# Patient Record
Sex: FEMALE | Race: WHITE | NOT HISPANIC OR LATINO | Employment: FULL TIME | URBAN - METROPOLITAN AREA
[De-identification: names, ages, dates, MRNs, and addresses within clinical notes are randomized per-mention and may not be internally consistent; named-entity substitution may affect disease eponyms.]

---

## 2017-01-05 ENCOUNTER — ALLIED HEALTH/NURSE VISIT (OUTPATIENT)
Dept: NURSING | Facility: CLINIC | Age: 37
End: 2017-01-05
Payer: COMMERCIAL

## 2017-01-05 DIAGNOSIS — Z23 NEED FOR PROPHYLACTIC VACCINATION AND INOCULATION AGAINST INFLUENZA: Primary | ICD-10-CM

## 2017-01-05 PROCEDURE — 90686 IIV4 VACC NO PRSV 0.5 ML IM: CPT

## 2017-01-05 PROCEDURE — 99207 ZZC NO CHARGE NURSE ONLY: CPT

## 2017-01-05 PROCEDURE — 90471 IMMUNIZATION ADMIN: CPT

## 2017-01-05 NOTE — PROGRESS NOTES
Injectable Influenza Immunization Documentation    1.  Is the person to be vaccinated sick today?  No    2. Does the person to be vaccinated have an allergy to eggs or to a component of the vaccine?  No    3. Has the person to be vaccinated today ever had a serious reaction to influenza vaccine in the past?  No    4. Has the person to be vaccinated ever had Guillain-[place] syndrome?  No     Form completed by PATIENT

## 2017-03-07 ENCOUNTER — OFFICE VISIT (OUTPATIENT)
Dept: PEDIATRICS | Facility: CLINIC | Age: 37
End: 2017-03-07
Payer: COMMERCIAL

## 2017-03-07 ENCOUNTER — TELEPHONE (OUTPATIENT)
Dept: PEDIATRICS | Facility: CLINIC | Age: 37
End: 2017-03-07

## 2017-03-07 VITALS
TEMPERATURE: 98.2 F | HEIGHT: 60 IN | BODY MASS INDEX: 26.72 KG/M2 | DIASTOLIC BLOOD PRESSURE: 74 MMHG | HEART RATE: 80 BPM | WEIGHT: 136.1 LBS | SYSTOLIC BLOOD PRESSURE: 112 MMHG

## 2017-03-07 DIAGNOSIS — F41.1 GENERALIZED ANXIETY DISORDER: Primary | ICD-10-CM

## 2017-03-07 PROCEDURE — 99213 OFFICE O/P EST LOW 20 MIN: CPT | Performed by: INTERNAL MEDICINE

## 2017-03-07 RX ORDER — SERTRALINE HYDROCHLORIDE 100 MG/1
200 TABLET, FILM COATED ORAL DAILY
Qty: 60 TABLET | Refills: 0 | Status: SHIPPED | OUTPATIENT
Start: 2017-03-07 | End: 2017-03-07

## 2017-03-07 RX ORDER — SERTRALINE HYDROCHLORIDE 100 MG/1
200 TABLET, FILM COATED ORAL DAILY
Qty: 180 TABLET | Refills: 3 | Status: SHIPPED | OUTPATIENT
Start: 2017-03-07 | End: 2018-03-27

## 2017-03-07 ASSESSMENT — ANXIETY QUESTIONNAIRES
6. BECOMING EASILY ANNOYED OR IRRITABLE: NOT AT ALL
2. NOT BEING ABLE TO STOP OR CONTROL WORRYING: NOT AT ALL
7. FEELING AFRAID AS IF SOMETHING AWFUL MIGHT HAPPEN: NOT AT ALL
3. WORRYING TOO MUCH ABOUT DIFFERENT THINGS: NOT AT ALL
GAD7 TOTAL SCORE: 0
1. FEELING NERVOUS, ANXIOUS, OR ON EDGE: NOT AT ALL
5. BEING SO RESTLESS THAT IT IS HARD TO SIT STILL: NOT AT ALL

## 2017-03-07 ASSESSMENT — PATIENT HEALTH QUESTIONNAIRE - PHQ9: 5. POOR APPETITE OR OVEREATING: NOT AT ALL

## 2017-03-07 NOTE — TELEPHONE ENCOUNTER
Panel Management Review      Patient has the following on her problem list:     Depression / Dysthymia review  PHQ-9 SCORE 1/11/2013 1/5/2015 3/7/2017   Total Score 0 2 -   Total Score - - 0      Patient is due for:  PHQ9      Composite cancer screening  Chart review shows that this patient is due/due soon for the following Pap Smear  Summary:    Patient is due/failing the following:   PAP    Action needed:   Patient needs office visit for FU depression.    Type of outreach:    Phone, spoke to patient.  appt scheduled    Questions for provider review:    None                                                                                                                                    Aliza WILCOX, POLLY,AAMA       Chart routed to not indicated .

## 2017-03-07 NOTE — PROGRESS NOTES
SUBJECTIVE:                                                    Lynda Cohen is a 36 year old female who presents to clinic today for the following health issues:    Medication Followup of Zoloft    Taking Medication as prescribed: yes    Side Effects:  drowsy    Medication Helping Symptoms:  yes   patient here for zoloft refill, working well without any side effects. patient has been on for years and it works very well. Anxiety has been well controlled. No other concerns or complaints today.     Problem list and histories reviewed & adjusted, as indicated.  Additional history: as documented    Patient Active Problem List    Diagnosis Date Noted     Short cord complicating labor and delivery, delivered 01/27/2014     Priority: Medium     Diagnosis updated by automated process. Provider to review and confirm.       Encounter for routine gynecological examination 03/17/2015     Problem list name updated by automated process. Provider to review       Blood type A+ 08/23/2012     CARDIOVASCULAR SCREENING; LDL GOAL LESS THAN 160 03/21/2012     Generalized anxiety disorders 08/03/2011     Problem list name updated by automated process. Provider to review and confirm       OCD (obsessive compulsive disorder) 08/03/2011     Social History   Substance Use Topics     Smoking status: Never Smoker     Smokeless tobacco: Never Used     Alcohol use No     Family History   Problem Relation Age of Onset     Lipids Mother      Lipids Brother      C.A.D. Maternal Grandfather      quintuple bypass.  CHF     Blood Disease Paternal Grandfather      Non-Hodgkins Lymphoma     Alzheimer Disease Maternal Aunt      late 50s     DIABETES Brother      Type 2       ROS:  A complete 10 point review of systems was taken and negative except for those noted in the subjective/HPI section(s) above     Problem list, Medication list, Allergies, and Medical/Social/Surgical histories reviewed in [facility] and updated as appropriate.    OBJECTIVE:                                                     /74 (BP Location: Right arm, Patient Position: Chair, Cuff Size: Adult Regular)  Pulse 80  Temp 98.2  F (36.8  C) (Oral)  Ht 5' (1.524 m)  Wt 136 lb 1.6 oz (61.7 kg)  LMP 02/21/2017  BMI 26.58 kg/m2   Constitutional: healthy, alert and no distress  Psychiatric: mentation appears normal and affect normal/bright  Presentation- normal:Abstract reasoning  Attention and concentration  Coherency and relevance of thought  Dress, grooming, personal hygiene  Facial expression  Information  Judgment  Memory  Mood  Orientation  Perceptions  Posture and motor behavoir  Speech  Thought content  Vocabulary,abnormal:none      VIKKI-7 SCORE 2/3/2016 3/7/2017   Total Score 3 0       PHQ-9 SCORE 1/5/2015 3/7/2017 3/7/2017   Total Score 2 - -   Total Score - 0 0                ASSESSMENT/PLAN:                                                        ICD-10-CM    1. Generalized anxiety disorders F41.1 sertraline (ZOLOFT) 100 MG tablet     DISCONTINUED: sertraline (ZOLOFT) 100 MG tablet   Comment: Well controlled on current medication(s). discussed with patient (or patient's parents/caregiver) pathophysiology of condition and treatment options.  Reviewed concept of anxiety (and depression) as function of biochemical imbalance of neurotransmitters/rationale for treatment. reviewed mindfulness, good self talk, exercise, good diet and sleep, etc and other modalities to help manage symptoms too.   avoid etoh and other drugs to treat symptoms, will likely make worse with other consequences too  Risks and benefits of medication(s) reviewed with patient.  Questions answered.  Counseling advised  Followup appointment in 12 month(s) - sooner if needed  patient will get labs with gyn appointment  Patient instructed to call for significant side effects medications or problems  Patient advised immediate presentation to hospital for suicidal thought, etc.     Patient verbalized understanding and is  agreeable to this plan.    Plan: sertraline (ZOLOFT) 100 MG tablet          Estimated body mass index is 26.58 kg/(m^2) as calculated from the following:    Height as of this encounter: 5' (1.524 m).    Weight as of this encounter: 136 lb 1.6 oz (61.7 kg).      Return to clinic as needed or if symptoms persist, change, worsen or if any new symptoms develop.      Naveed Sadler M.D.  Internal Medicine-Pediatrics

## 2017-03-07 NOTE — MR AVS SNAPSHOT
"              After Visit Summary   3/7/2017    Lynda Cohen    MRN: [id number]           Patient Information     Date Of Birth          1980        Visit Information        Provider Department      3/7/2017 1:40 PM Naveed Sadler MD [facility]        Today's Diagnoses     Generalized anxiety disorders    -  1      Care Instructions                   Generalized Anxiety Disorder  What is generalized anxiety disorder?   Generalized anxiety disorder (VIKKI) is a condition in which a person worries excessively and unrealistically. They may also be jittery, restless, or dizzy. When these symptoms last for at least 6 months, a diagnosis of VIKKI may be made.  VIKKI may exist by itself, or with both anxiety and depression. It is estimated that almost 5% of people have had this disorder during their lives.  How does it occur?   The cause of VIKKI is unknown. Genetic and environmental factors play a role. Women have VIKKI about twice as often as men.  The worry in VIKKI is not about panic attacks or being afraid in public places. It is typically \"free-floating\" anxiety out of proportion to any real life situation. The worrying can interfere with normal day-to-day activities and work or school.  What are the symptoms?   Symptoms include excessive, unrealistic, and uncontrollable worrying about many things such as:  the state of the world   the economy   violence in society   your job   the bills   chores   family members  Physical symptoms such as muscle tension, sleep problems, or feeling on edge usually go along with anxiety. A person may be short-tempered and unable to focus or concentrate because of the worrying. Other symptoms include sweating, shaking, having a very fast heartbeat, feeling out of breath, needing to go to the bathroom often and feeling like fainting. People with VIKKI may be uneasy in a group or in a waiting room.  How is it diagnosed?   There is no lab test for VIKKI. Your healthcare provider " or therapist will ask about your symptoms. He or she will make sure you do not have a medical illness or drug or alcohol problem that could cause the symptoms. Some medicines can cause anxiety or make it worse. These include asthma medicines, stimulants, and steroids such as prednisone.  If you have had the symptoms for at least 6 months, if you have had to cut back on your activities, and if you find it difficult to get things done, you may be diagnosed with generalized anxiety disorder.  How is it treated?   Different types of approaches have proven helpful in treating VIKKI. These include medicine, behavior therapy, relaxation therapy, cognitive therapy, and stress management techniques. Which treatments your healthcare provider or therapist uses may depend upon how much the disorder interferes with your day-to-day life.  Several types of medicines can help treat VIKKI. Your healthcare provider will work with you to carefully select the best one for you.  How long will the effects last?   VIKKI can last many years and sometimes an entire lifetime.   How can I take care of myself?   Get support. Talk with family and friends. Consider joining a support group in your area. Go to a stress management class in your local community.   Learn to manage stress. Ask for help at home and work when the load is too great to handle. Find ways to relax, for example take up a hobby, listen to music, watch movies, take walks. Try deep breathing exercises when you feel stressed.   Take care of your physical health. Try to get at least 7 to 9 hours of sleep each night. Eat a healthy diet. Limit caffeine. If you smoke, quit. Avoid alcohol and drugs, because they can make your symptoms worse. Exercise according to your healthcare provider's instructions.   Check your medicines. To help prevent problems, tell your healthcare provider and pharmacist about all the medicines, natural remedies, vitamins, and other supplements that you take.    Contact your healthcare provider or therapist if you have any questions or your symptoms seem to be getting worse.  You may also want to contact [facility] (formerly the National Mental Health Association or [employer]). [employer]'s toll-free Information Center number is 3-730-096-[employer]. Its web site address is http://www.RUST.org            Follow-ups after your visit        Follow-up notes from your care team     Return in about 1 year (around 3/7/2018).      Who to contact     If you have questions or need follow up information about today's clinic visit or your schedule please contact [facility] directly at [phone].  Normal or non-critical lab and imaging results will be communicated to you by MyChart, letter or phone within 4 business days after the clinic has received the results. If you do not hear from us within 7 days, please contact the clinic through [employer] or phone. If you have a critical or abnormal lab result, we will notify you by phone as soon as possible.  Submit refill requests through [employer] or call your pharmacy and they will forward the refill request to us. Please allow 3 business days for your refill to be completed.          Additional Information About Your Visit        MyChart Information     [employer] gives you secure access to your electronic health record. If you see a primary care provider, you can also send messages to your care team and make appointments. If you have questions, please call your primary care clinic.  If you do not have a primary care provider, please call [phone] and they will assist you.        Care EveryWhere ID     This is your Care EveryWhere ID. This could be used by other organizations to access your [facility] medical records  [id number]        Your Vitals Were     Pulse Temperature Height Last Period BMI (Body Mass Index)       80 98.2  F (36.8  C) (Oral) 5' (1.524 m) 02/21/2017 26.58 kg/m2        Blood Pressure from Last 3 Encounters:    03/07/17 112/74   09/15/16 130/80   03/31/16 108/60    Weight from Last 3 Encounters:   03/07/17 136 lb 1.6 oz (61.7 kg)   09/15/16 131 lb 14.4 oz (59.8 kg)   03/31/16 136 lb (61.7 kg)              Today, you had the following     No orders found for display         Today's Medication Changes          These changes are accurate as of: 3/7/17  1:59 PM.  If you have any questions, ask your nurse or doctor.               Start taking these medicines.        Dose/Directions    sertraline 100 MG tablet   Commonly known as:  ZOLOFT   Used for:  Generalized anxiety disorder   Started by:  Naveed Sadler MD        Dose:  200 mg   Take 2 tablets (200 mg) by mouth daily   Quantity:  180 tablet   Refills:  3         Stop taking these medicines if you haven't already. Please contact your care team if you have questions.     azithromycin 250 MG tablet   Commonly known as:  ZITHROMAX   Stopped by:  Naveed Sadler MD           benzonatate 200 MG capsule   Commonly known as:  TESSALON   Stopped by:  Naveed Sadler MD                Where to get your medicines      These medications were sent to [facility] - [address] [address]     Phone:  [phone]     sertraline 100 MG tablet                Primary Care Provider Office Phone # Fax #    Naveed Sadler [phone]       [address] [place] [address]        Thank you!     Thank you for choosing [facility]  for your care. Our goal is always to provide you with excellent care. Hearing back from our patients is one way we can continue to improve our services. Please take a few minutes to complete the written survey that you may receive in the mail after your visit with us. Thank you!             Your Updated Medication List - Protect others around you: Learn how to safely use, store and throw away your medicines at  www.disposemymeds.org.          This list is accurate as of: 3/7/17  1:59 PM.  Always use your most recent med list.                   Brand Name Dispense Instructions for use    ALLEGRA PO          norethindrone-ethinyl estradiol 0.5-35 MG-MCG per tablet    NECON    120 tablet    Take 1 tablet by mouth daily       sertraline 100 MG tablet    ZOLOFT    180 tablet    Take 2 tablets (200 mg) by mouth daily

## 2017-03-07 NOTE — NURSING NOTE
Chief Complaint   Patient presents with     Refill Request       Initial /74 (BP Location: Right arm, Patient Position: Chair, Cuff Size: Adult Regular)  Pulse 80  Temp 98.2  F (36.8  C) (Oral)  Ht 5' (1.524 m)  Wt 136 lb 1.6 oz (61.7 kg)  LMP 02/21/2017  BMI 26.58 kg/m2 Estimated body mass index is 26.58 kg/(m^2) as calculated from the following:    Height as of this encounter: 5' (1.524 m).    Weight as of this encounter: 136 lb 1.6 oz (61.7 kg).  Medication Reconciliation: complete   Aliza WILCOX, POLLY,AAMA    '

## 2017-03-07 NOTE — PATIENT INSTRUCTIONS
"               Generalized Anxiety Disorder  What is generalized anxiety disorder?   Generalized anxiety disorder (VIKKI) is a condition in which a person worries excessively and unrealistically. They may also be jittery, restless, or dizzy. When these symptoms last for at least 6 months, a diagnosis of VIKKI may be made.  VIKKI may exist by itself, or with both anxiety and depression. It is estimated that almost 5% of people have had this disorder during their lives.  How does it occur?   The cause of VIKKI is unknown. Genetic and environmental factors play a role. Women have VIKKI about twice as often as men.  The worry in VIKKI is not about panic attacks or being afraid in public places. It is typically \"free-floating\" anxiety out of proportion to any real life situation. The worrying can interfere with normal day-to-day activities and work or school.  What are the symptoms?   Symptoms include excessive, unrealistic, and uncontrollable worrying about many things such as:  the state of the world   the economy   violence in society   your job   the bills   chores   family members  Physical symptoms such as muscle tension, sleep problems, or feeling on edge usually go along with anxiety. A person may be short-tempered and unable to focus or concentrate because of the worrying. Other symptoms include sweating, shaking, having a very fast heartbeat, feeling out of breath, needing to go to the bathroom often and feeling like fainting. People with VIKKI may be uneasy in a group or in a waiting room.  How is it diagnosed?   There is no lab test for VIKKI. Your healthcare provider or therapist will ask about your symptoms. He or she will make sure you do not have a medical illness or drug or alcohol problem that could cause the symptoms. Some medicines can cause anxiety or make it worse. These include asthma medicines, stimulants, and steroids such as prednisone.  If you have had the symptoms for at least 6 months, if you have had to cut " back on your activities, and if you find it difficult to get things done, you may be diagnosed with generalized anxiety disorder.  How is it treated?   Different types of approaches have proven helpful in treating VIKKI. These include medicine, behavior therapy, relaxation therapy, cognitive therapy, and stress management techniques. Which treatments your healthcare provider or therapist uses may depend upon how much the disorder interferes with your day-to-day life.  Several types of medicines can help treat VIKKI. Your healthcare provider will work with you to carefully select the best one for you.  How long will the effects last?   VIKKI can last many years and sometimes an entire lifetime.   How can I take care of myself?   Get support. Talk with family and friends. Consider joining a support group in your area. Go to a stress management class in your local community.   Learn to manage stress. Ask for help at home and work when the load is too great to handle. Find ways to relax, for example take up a hobby, listen to music, watch movies, take walks. Try deep breathing exercises when you feel stressed.   Take care of your physical health. Try to get at least 7 to 9 hours of sleep each night. Eat a healthy diet. Limit caffeine. If you smoke, quit. Avoid alcohol and drugs, because they can make your symptoms worse. Exercise according to your healthcare provider's instructions.   Check your medicines. To help prevent problems, tell your healthcare provider and pharmacist about all the medicines, natural remedies, vitamins, and other supplements that you take.   Contact your healthcare provider or therapist if you have any questions or your symptoms seem to be getting worse.  You may also want to contact [facility] [employer] [facility] (formerly the National Mental Health Association or [employer]HA). [facility]'s toll-free Information Center number is 2-228-014-[facility]. Its web site address is http://www.NMHA.org

## 2017-03-08 ASSESSMENT — ANXIETY QUESTIONNAIRES: GAD7 TOTAL SCORE: 0

## 2017-03-08 ASSESSMENT — PATIENT HEALTH QUESTIONNAIRE - PHQ9: SUM OF ALL RESPONSES TO PHQ QUESTIONS 1-9: 0

## 2017-04-28 ENCOUNTER — TELEPHONE (OUTPATIENT)
Dept: OBGYN | Facility: CLINIC | Age: 37
End: 2017-04-28

## 2017-04-28 DIAGNOSIS — Z30.011 ENCOUNTER FOR INITIAL PRESCRIPTION OF CONTRACEPTIVE PILLS: ICD-10-CM

## 2017-04-28 NOTE — TELEPHONE ENCOUNTER
Patient called to make her annual physical appointment.   We scheduled it for June 8th, but she will need more birth control soon.    Please refill that and contact the patient.  Thank you!  Rere LOPEZ  Central Scheduler

## 2017-04-28 NOTE — TELEPHONE ENCOUNTER
Norethindrone-Ethinyl estradiol      Last Written Prescription Date: 03/31/16  Last Fill Quantity: 120,  # refills: 3   Last Office Visit with [facility], [facility] or [facility] prescribing provider: 03/31/16                                         Next 5 appointments (look out 90 days)     Jun 08, 2017 10:30 AM CDT   PHYSICAL with Kayleigh Haro,    [facility] ([facility]    [address]  [facility] [id number] [phone]                  Medication is being filled for 1 time refill only due to:  upcoming appt   Called pt, notified rx sent.

## 2017-06-13 ENCOUNTER — TRANSFERRED RECORDS (OUTPATIENT)
Dept: HEALTH INFORMATION MANAGEMENT | Facility: CLINIC | Age: 37
End: 2017-06-13

## 2017-06-13 DIAGNOSIS — Z84.81 FAMILY HISTORY OF CARRIER OF GENETIC DISEASE: Primary | ICD-10-CM

## 2017-06-13 PROCEDURE — 36415 COLL VENOUS BLD VENIPUNCTURE: CPT | Performed by: MEDICAL GENETICS

## 2017-06-14 DIAGNOSIS — Z84.81 FAMILY HISTORY OF CARRIER OF GENETIC DISEASE: ICD-10-CM

## 2017-06-14 PROCEDURE — 84999 UNLISTED CHEMISTRY PROCEDURE: CPT | Performed by: MEDICAL GENETICS

## 2017-06-14 PROCEDURE — 81402 MOPATH PROCEDURE LEVEL 3: CPT | Performed by: MEDICAL GENETICS

## 2017-06-15 ENCOUNTER — OFFICE VISIT (OUTPATIENT)
Dept: OBGYN | Facility: CLINIC | Age: 37
End: 2017-06-15
Payer: COMMERCIAL

## 2017-06-15 VITALS
DIASTOLIC BLOOD PRESSURE: 84 MMHG | HEART RATE: 72 BPM | BODY MASS INDEX: 26.82 KG/M2 | WEIGHT: 136.6 LBS | SYSTOLIC BLOOD PRESSURE: 134 MMHG | HEIGHT: 60 IN

## 2017-06-15 DIAGNOSIS — Z30.011 ENCOUNTER FOR INITIAL PRESCRIPTION OF CONTRACEPTIVE PILLS: ICD-10-CM

## 2017-06-15 DIAGNOSIS — Z00.00 ROUTINE GENERAL MEDICAL EXAMINATION AT A HEALTH CARE FACILITY: Primary | ICD-10-CM

## 2017-06-15 PROCEDURE — 99395 PREV VISIT EST AGE 18-39: CPT | Performed by: OBSTETRICS & GYNECOLOGY

## 2017-06-15 NOTE — PROGRESS NOTES
SUBJECTIVE:                                                      Lynda is a 37 year old  female who presents for annual exam.     Using oral contraceptives for contraception, taking continuously for 10 weeks as this has resolved her breakthrough bleeding completely.  She is not currently considering pregnancy.  Besides routine health maintenance, she has no other health concerns today .  GYNECOLOGIC HISTORY:    Lynda is sexually active with 1 male partner(s) and is currently in a monogamous relationship.    History sexually transmitted infections:No STD history  STI testing offered?  Declined  History of abnormal Pap smear: In early 20s, resolved. - age 30-65 PAP every 5 years with negative HPV co-testing recommended  Family history of breast CA: No  Family history of uterine/ovarian CA: No    Family history of colon CA: No      HISTORY:  Obstetric History       T0      L1     SAB0   TAB0   Ectopic0   Multiple0   Live Births1       # Outcome Date GA Lbr Miguel Angel/2nd Weight Sex Delivery Anes PTL Lv   1  12 32w3d  3 lb 10 oz (1.645 kg) M CS-LTranv  Y SOFY      Name: Nicanor Lucio      Apgar1:  7                Apgar5: 9      Obstetric Comments   Delivered for recurrent bradycardia, decelerations     Past Medical History:   Diagnosis Date     Abnormal Pap smear     colposcopy,      Hx of previous reproductive problem     clomid     Irregular menses      Umbilical cord short in labor and delivery, delivered      Past Surgical History:   Procedure Laterality Date      SECTION  2012    Procedure:  SECTION;   Section.;  Surgeon: Juancarlos Lezama MD;  Location: [facility]     Family History   Problem Relation Age of Onset     Lipids Mother      Lipids Brother      C.A.D. Maternal Grandfather      quintuple bypass.  CHF     Blood Disease Paternal Grandfather      Non-Hodgkins Lymphoma     Alzheimer Disease Maternal Aunt      late 50s     DIABETES Brother       Type 2     Social History     Social History     Marital status:      Spouse name: Naveed     Number of children: 1     Years of education: N/A     Occupational History      [employer]     Social History Main Topics     Smoking status: Never Smoker     Smokeless tobacco: Never Used     Alcohol use No     Drug use: No     Sexual activity: Yes     Partners: Male     Other Topics Concern      Service No     Blood Transfusions No     Caffeine Concern Yes     Sensitive to caffeine     Occupational Exposure No     Hobby Hazards No     Sleep Concern No     Stress Concern No     Weight Concern No     Special Diet No     Back Care No     Exercise No     Bike Helmet Yes     Seat Belt Yes     Self-Exams No     Social History Narrative    Living arrangements - the patient lives with her family       Current Outpatient Prescriptions:      norethindrone-ethinyl estradiol (NECON) 0.5-35 MG-MCG per tablet, Take 1 tablet by mouth daily, Disp: 120 tablet, Rfl: 0     sertraline (ZOLOFT) 100 MG tablet, Take 2 tablets (200 mg) by mouth daily, Disp: 180 tablet, Rfl: 3     Fexofenadine HCl (ALLEGRA PO), , Disp: , Rfl:      Allergies   Allergen Reactions     Caffeine      Codeine Sulfate      Vicodin [Hydrocodone-Acetaminophen]        Past medical, surgical, social and family history were reviewed and updated in [facility].    ROS:   12 point review of systems negative other than symptoms noted below.      OBJECTIVE:                                                      EXAM:  There were no vitals taken for this visit.   BMI: There is no height or weight on file to calculate BMI.  General: Alert and oriented, no distress.  Psychiatric: Mood and affect within normal limits.  Skin: Warm and dry, no lesions, rashes or discolorations.  Neck: Neck supple. Thyroid palpbably normal in size and without nodularity.  Cardiovascular: Regular rate and rhythm, no murmurs, rubs or gallops.   Lungs:  Clear to auscultation  "bilaterally, breathing is unlabored.  Breasts:  Symmetric, no skin changes.  No dominant masses bilaterally.   Lymph:  No cervical, supraclavicular, infraclavicular, axillary or inguinal lymphadenopathy palpable.   Abdomen: Soft, nontender, no hepatosplenomegaly, no rebound or guarding, no masses, no hernias.   Vulva:  No external lesions, normal female hair distribution, no inguinal adenopathy.    Urethra:  Midline, non-tender, well supported, no discharge  Vagina:  Well-estrogenized, no abnormal discharge, no lesions  Remainder of pelvic exam not needed today.  Rectal Exam: deferred  Musculoskeletal: extremities normal      COUNSELING:   Reviewed preventive health counseling, as reflected in patient instructions       Regular exercise       Healthy diet/nutrition       Family planning   reports that she has never smoked. She has never used smokeless tobacco.        ASSESSMENT/PLAN:                                                      37 year old female with satisfactory annual exam  (Z00.00) Routine general medical examination at a health care facility  (primary encounter diagnosis)  Comment:   Plan: pap and HPV every 5 years, due again in 2019. Discussed healthy diet. We had a long discussion on weight gain and attempts at weight loss. Discussed exercise as an important adjunct to any dietary plan, specifically incorporating interval training and weight training.  We talked about her diet, and that in general it is a good idea to increase fresh fruits and vegetables at each meal, make sure there is a lean protein source at most meals, and to monitor intake from simple carbohydrates. She should incorporate complex carbohydrates instead, and have the largest portion of carbohydrates come from fruit and vegetable sources. We also talked about not expecting this to be a quick or easy fix, or a \"diet\" that she will one day \"quit,\" but rather a lifestyle change. Discussed the concept of 80/20, where she aims for this " change 80% of the time, realizing that she needs to have room for lapses and days that are more difficult.      (Z30.011) Encounter for initial prescription of contraceptive pills  Comment:   Plan: norethindrone-ethinyl estradiol (NECON) 0.5-35         MG-MCG per tablet, norethindrone-ethinyl         estradiol (ORTHO-NOVUM 1-35 TAB,NORTREL 1-35         TAB) 1-35 MG-MCG per tablet        Continue these as prescribed. She is not planning, most likely, to have more children. She states that she may consider permanent sterilization at some point, but for now is happy to continue oral contraceptive pills.      Verito Sanchez MD

## 2017-06-15 NOTE — NURSING NOTE
Chief Complaint   Patient presents with     Gyn Exam       Initial /84  Pulse 72  Ht 5' (1.524 m)  Wt 136 lb 9.6 oz (62 kg)  LMP 2017 (Approximate)  Breastfeeding? No  BMI 26.68 kg/m2 Estimated body mass index is 26.68 kg/(m^2) as calculated from the following:    Height as of this encounter: 5' (1.524 m).    Weight as of this encounter: 136 lb 9.6 oz (62 kg).  BP completed using cuff size: regular        The following HM Due: NONE      The following patient reported/Care Every where data was sent to:  P ABSTRACT QUALITY INITIATIVES [[id number]]  NA     patient has appointment for today    Meggan HARO

## 2017-06-15 NOTE — MR AVS SNAPSHOT
After Visit Summary   6/15/2017    Lynda Cohen    MRN: [id number]           Patient Information     Date Of Birth          1980        Visit Information        Provider Department      6/15/2017 10:30 AM Verito Sanchez MD [facility]        Today's Diagnoses     Routine general medical examination at a health care facility    -  1    Encounter for initial prescription of contraceptive pills           Follow-ups after your visit        Who to contact     If you have questions or need follow up information about today's clinic visit or your schedule please contact [facility] directly at [phone].  Normal or non-critical lab and imaging results will be communicated to you by CeloNovahart, letter or phone within 4 business days after the clinic has received the results. If you do not hear from us within 7 days, please contact the clinic through CeloNovahart or phone. If you have a critical or abnormal lab result, we will notify you by phone as soon as possible.  Submit refill requests through [employer] or call your pharmacy and they will forward the refill request to us. Please allow 3 business days for your refill to be completed.          Additional Information About Your Visit        MyChart Information     [employer] gives you secure access to your electronic health record. If you see a primary care provider, you can also send messages to your care team and make appointments. If you have questions, please call your primary care clinic.  If you do not have a primary care provider, please call [phone] and they will assist you.        Care EveryWhere ID     This is your Care EveryWhere ID. This could be used by other organizations to access your [facility] medical records  [id number]        Your Vitals Were     Pulse Height Last Period Breastfeeding? BMI (Body Mass Index)       72 5' (1.524 m) 05/04/2017 (Approximate) No 26.68 kg/m2        Blood Pressure from Last  3 Encounters:   06/15/17 134/84   03/07/17 112/74   09/15/16 130/80    Weight from Last 3 Encounters:   06/15/17 136 lb 9.6 oz (62 kg)   03/07/17 136 lb 1.6 oz (61.7 kg)   09/15/16 131 lb 14.4 oz (59.8 kg)              Today, you had the following     No orders found for display         Today's Medication Changes          These changes are accurate as of: 6/15/17 10:59 AM.  If you have any questions, ask your nurse or doctor.               These medicines have changed or have updated prescriptions.        Dose/Directions    * norethindrone-ethinyl estradiol 0.5-35 MG-MCG per tablet   Commonly known as:  NECON   This may have changed:  additional instructions   Used for:  Encounter for initial prescription of contraceptive pills   Changed by:  Verito Sanchez MD        Dose:  1 tablet   Take 1 tablet by mouth daily Skip placebo pills for 3 months.   Quantity:  108 tablet   Refills:  3       * norethindrone-ethinyl estradiol 1-35 MG-MCG per tablet   Commonly known as:  ORTHO-NOVUM 1-35 TAB,NORTREL 1-35 TAB   This may have changed:  You were already taking a medication with the same name, and this prescription was added. Make sure you understand how and when to take each.   Used for:  Encounter for initial prescription of contraceptive pills   Changed by:  Verito Sanchez MD        Dose:  1 tablet   Take 1 tablet by mouth daily   Quantity:  28 tablet   Refills:  0       * Notice:  This list has 2 medication(s) that are the same as other medications prescribed for you. Read the directions carefully, and ask your doctor or other care provider to review them with you.         Where to get your medicines      These medications were sent to [facility]/pharmacy #[id number] - [place], [place] - [address] AT [place] [address], [place] [address]     Phone:  [phone]     norethindrone-ethinyl estradiol 1-35 MG-MCG per tablet         These medications were sent to "[facility] -  [place], [place] - [address]  [address] [place] [address]     Phone:  [phone]     norethindrone-ethinyl estradiol 0.5-35 MG-MCG per tablet                Primary Care Provider Office Phone # Fax #    Naveed Sadler [phone]       [address] [place] [address]        Thank you!     Thank you for choosing [facility]  for your care. Our goal is always to provide you with excellent care. Hearing back from our patients is one way we can continue to improve our services. Please take a few minutes to complete the written survey that you may receive in the mail after your visit with us. Thank you!             Your Updated Medication List - Protect others around you: Learn how to safely use, store and throw away your medicines at www.disposemymeds.org.          This list is accurate as of: 6/15/17 10:59 AM.  Always use your most recent med list.                   Brand Name Dispense Instructions for use    ALLEGRA PO          * norethindrone-ethinyl estradiol 0.5-35 MG-MCG per tablet    NECON    108 tablet    Take 1 tablet by mouth daily Skip placebo pills for 3 months.       * norethindrone-ethinyl estradiol 1-35 MG-MCG per tablet    ORTHO-NOVUM 1-35 TAB,NORTREL 1-35 TAB    28 tablet    Take 1 tablet by mouth daily       sertraline 100 MG tablet    ZOLOFT    180 tablet    Take 2 tablets (200 mg) by mouth daily       * Notice:  This list has 2 medication(s) that are the same as other medications prescribed for you. Read the directions carefully, and ask your doctor or other care provider to review them with you.

## 2017-06-16 LAB — MISCELLANEOUS TEST: NORMAL

## 2017-06-19 ENCOUNTER — TELEPHONE (OUTPATIENT)
Dept: OBGYN | Facility: CLINIC | Age: 37
End: 2017-06-19

## 2017-06-19 NOTE — TELEPHONE ENCOUNTER
Dr. Sanchez,   [facility] faxed us a form stating that they wanted clarification on what ocp pt is taking.   Last OV 6/15/17.     Please review the pts chart and advise:  Either  Nortrel 0.5/35 (current therapy and was sent to [facility] on 6/15/17)  Or   Alyacen 1/35 (1 month sent to local pharmacy on 6/15/17)    I placed the form from [employer] by your desk in [facility].     Thanks.   Amanda

## 2017-06-27 LAB
RESULT: NORMAL
SEND OUTS MISC TEST CODE: NORMAL
SEND OUTS MISC TEST SPECIMEN: NORMAL
TEST NAME: NORMAL

## 2018-03-27 DIAGNOSIS — F41.1 GENERALIZED ANXIETY DISORDER: ICD-10-CM

## 2018-03-28 NOTE — TELEPHONE ENCOUNTER
"Requested Prescriptions   Pending Prescriptions Disp Refills     sertraline (ZOLOFT) 100 MG tablet [Pharmacy Med Name: SERTRALINE HCL TABS 100MG]  Last Written Prescription Date:  3/7/2017  Last Fill Quantity: 180 tablet,  # refills: 3   Last office visit: 3/7/2017 with prescribing provider:  Naveed Sadler   Future Office Visit:     180 tablet 3     Sig: TAKE 2 TABLETS DAILY    SSRIs Protocol Failed    PHQ-9 SCORE 1/5/2015 3/7/2017 3/7/2017   Total Score 2 - -   Total Score - 0 0     VIKKI-7 SCORE 2/3/2016 3/7/2017   Total Score 3 0          Failed - Recent (12 mo) or future (30 days) visit within the authorizing provider's specialty    Patient had office visit in the last 12 months or has a visit in the next 30 days with authorizing provider or within the authorizing provider's specialty.  See \"Patient Info\" tab in inbasket, or \"Choose Columns\" in Meds & Orders section of the refill encounter.           Passed - Patient is age 18 or older       Passed - No active pregnancy on record       Passed - No positive pregnancy test in last 12 months          "

## 2018-03-29 RX ORDER — SERTRALINE HYDROCHLORIDE 100 MG/1
TABLET, FILM COATED ORAL
Qty: 60 TABLET | Refills: 0 | Status: SHIPPED | OUTPATIENT
Start: 2018-03-29 | End: 2018-05-03

## 2018-05-03 ENCOUNTER — OFFICE VISIT (OUTPATIENT)
Dept: PEDIATRICS | Facility: CLINIC | Age: 38
End: 2018-05-03
Payer: COMMERCIAL

## 2018-05-03 VITALS
TEMPERATURE: 98.1 F | HEART RATE: 92 BPM | WEIGHT: 141 LBS | DIASTOLIC BLOOD PRESSURE: 82 MMHG | HEIGHT: 60 IN | BODY MASS INDEX: 27.68 KG/M2 | OXYGEN SATURATION: 97 % | SYSTOLIC BLOOD PRESSURE: 124 MMHG

## 2018-05-03 DIAGNOSIS — F42.9 OBSESSIVE-COMPULSIVE DISORDER, UNSPECIFIED TYPE: Primary | ICD-10-CM

## 2018-05-03 DIAGNOSIS — F41.1 GENERALIZED ANXIETY DISORDER: ICD-10-CM

## 2018-05-03 PROCEDURE — 99214 OFFICE O/P EST MOD 30 MIN: CPT | Performed by: INTERNAL MEDICINE

## 2018-05-03 RX ORDER — SERTRALINE HYDROCHLORIDE 100 MG/1
200 TABLET, FILM COATED ORAL DAILY
Qty: 30 TABLET | Refills: 0 | Status: SHIPPED | OUTPATIENT
Start: 2018-05-03 | End: 2019-05-16

## 2018-05-03 RX ORDER — SERTRALINE HYDROCHLORIDE 100 MG/1
200 TABLET, FILM COATED ORAL DAILY
Qty: 180 TABLET | Refills: 3 | Status: SHIPPED | OUTPATIENT
Start: 2018-05-03 | End: 2018-05-03

## 2018-05-03 ASSESSMENT — ANXIETY QUESTIONNAIRES
6. BECOMING EASILY ANNOYED OR IRRITABLE: NOT AT ALL
2. NOT BEING ABLE TO STOP OR CONTROL WORRYING: NOT AT ALL
1. FEELING NERVOUS, ANXIOUS, OR ON EDGE: NOT AT ALL
IF YOU CHECKED OFF ANY PROBLEMS ON THIS QUESTIONNAIRE, HOW DIFFICULT HAVE THESE PROBLEMS MADE IT FOR YOU TO DO YOUR WORK, TAKE CARE OF THINGS AT HOME, OR GET ALONG WITH OTHER PEOPLE: NOT DIFFICULT AT ALL
GAD7 TOTAL SCORE: 0
7. FEELING AFRAID AS IF SOMETHING AWFUL MIGHT HAPPEN: NOT AT ALL
3. WORRYING TOO MUCH ABOUT DIFFERENT THINGS: NOT AT ALL
5. BEING SO RESTLESS THAT IT IS HARD TO SIT STILL: NOT AT ALL

## 2018-05-03 ASSESSMENT — PATIENT HEALTH QUESTIONNAIRE - PHQ9: 5. POOR APPETITE OR OVEREATING: NOT AT ALL

## 2018-05-03 NOTE — PATIENT INSTRUCTIONS
Refilled your zoloft for 1 year.    No new changes today.    Jcarlos Mcclelland MD  Internal Medicine and Pediatrics

## 2018-05-03 NOTE — MR AVS SNAPSHOT
After Visit Summary   5/3/2018    Lynda Cohen    MRN: [id number]           Patient Information     Date Of Birth          1980        Visit Information        Provider Department      5/3/2018 2:40 PM Jcarlos Mcclelland MD [facility]        Today's Diagnoses     Generalized anxiety disorders          Care Instructions    Refilled your zoloft for 1 year.    No new changes today.    Jcarlos Mcclelland MD  Internal Medicine and Pediatrics             Follow-ups after your visit        Follow-up notes from your care team     Return in about 1 year (around 5/3/2019) for Physical Exam.      Who to contact     If you have questions or need follow up information about today's clinic visit or your schedule please contact [facility] directly at [phone].  Normal or non-critical lab and imaging results will be communicated to you by Saborstudiohart, letter or phone within 4 business days after the clinic has received the results. If you do not hear from us within 7 days, please contact the clinic through Saborstudiohart or phone. If you have a critical or abnormal lab result, we will notify you by phone as soon as possible.  Submit refill requests through wildcraft or call your pharmacy and they will forward the refill request to us. Please allow 3 business days for your refill to be completed.          Additional Information About Your Visit        MyChart Information     [employer]craft gives you secure access to your electronic health record. If you see a primary care provider, you can also send messages to your care team and make appointments. If you have questions, please call your primary care clinic.  If you do not have a primary care provider, please call [phone] and they will assist you.        Care EveryWhere ID     This is your Care EveryWhere ID. This could be used by other organizations to access your [facility] medical records  [id number]        Your Vitals Were     Pulse Temperature Height  Pulse Oximetry BMI (Body Mass Index)       92 98.1  F (36.7  C) (Oral) 5' (1.524 m) 97% 27.54 kg/m2        Blood Pressure from Last 3 Encounters:   05/03/18 124/82   06/15/17 134/84   03/07/17 112/74    Weight from Last 3 Encounters:   05/03/18 141 lb (64 kg)   06/15/17 136 lb 9.6 oz (62 kg)   03/07/17 136 lb 1.6 oz (61.7 kg)              Today, you had the following     No orders found for display         Today's Medication Changes          These changes are accurate as of 5/3/18  3:33 PM.  If you have any questions, ask your nurse or doctor.               Start taking these medicines.        Dose/Directions    sertraline 100 MG tablet   Commonly known as:  ZOLOFT   Used for:  Generalized anxiety disorder   Started by:  Jcarlos Mcclelland MD        Dose:  200 mg   Take 2 tablets (200 mg) by mouth daily   Quantity:  30 tablet   Refills:  0            Where to get your medicines      These medications were sent to [facility] - [address]   [address], [place] [address]     Phone:  [phone]     sertraline 100 MG tablet                Primary Care Provider Office Phone # Fax #    Naveed Sadler [phone]       [address] [place] [address]        Equal Access to Services     [facility] AH: Hadii maryjo gallardo hadasho Sojuan ramon, waaxda luqadaha, qaybta kaalmada janet, raya zapata. So [facility] [phone].    ATENCIÓN: Si habla español, tiene a ferrer disposición servicios gratuitos de asistencia lingüística. Cynthia al [phone].    We comply with applicable federal civil rights laws and [place] laws. We do not discriminate on the basis of race, color, national origin, age, disability, sex, sexual orientation, or gender identity.            Thank you!     Thank you for choosing [facility]  for your care. Our goal is always to provide you with excellent care. Hearing back from our  patients is one way we can continue to improve our services. Please take a few minutes to complete the written survey that you may receive in the mail after your visit with us. Thank you!             Your Updated Medication List - Protect others around you: Learn how to safely use, store and throw away your medicines at www.disposemymeds.org.          This list is accurate as of 5/3/18  3:33 PM.  Always use your most recent med list.                   Brand Name Dispense Instructions for use Diagnosis    ALLEGRA PO           norethindrone-ethinyl estradiol 0.5-35 MG-MCG per tablet    NECON    108 tablet    Take 1 tablet by mouth daily Skip placebo pills for 3 months.    Encounter for initial prescription of contraceptive pills       sertraline 100 MG tablet    ZOLOFT    30 tablet    Take 2 tablets (200 mg) by mouth daily    Generalized anxiety disorder

## 2018-05-04 ASSESSMENT — PATIENT HEALTH QUESTIONNAIRE - PHQ9: SUM OF ALL RESPONSES TO PHQ QUESTIONS 1-9: 1

## 2018-05-04 ASSESSMENT — ANXIETY QUESTIONNAIRES: GAD7 TOTAL SCORE: 0

## 2018-05-25 DIAGNOSIS — Z30.011 ENCOUNTER FOR INITIAL PRESCRIPTION OF CONTRACEPTIVE PILLS: ICD-10-CM

## 2018-05-25 RX ORDER — NORETHINDRONE AND ETHINYL ESTRADIOL 0.5-0.035
KIT ORAL
Qty: 112 TABLET | Refills: 0 | Status: SHIPPED | OUTPATIENT
Start: 2018-05-25 | End: 2018-07-27

## 2018-05-25 NOTE — TELEPHONE ENCOUNTER
"Requested Prescriptions   Pending Prescriptions Disp Refills     NORTREL 0.5/35, 28, 0.5-35 MG-MCG per tablet [Pharmacy Med Name: NORTREL TABS MT 28'S 0.5/35] 112 tablet 3     Sig: TAKE 1 TABLET DAILY SKIP PLACEBO PILLS FOR 3 MONTHS    Contraceptives Protocol Passed    5/25/2018 12:48 AM       Passed - Patient is not a current smoker if age is 35 or older       Passed - Recent (12 mo) or future (30 days) visit within the authorizing provider's specialty    Patient had office visit in the last 12 months or has a visit in the next 30 days with authorizing provider or within the authorizing provider's specialty.  See \"Patient Info\" tab in inbasket, or \"Choose Columns\" in Meds & Orders section of the refill encounter.           Passed - No active pregnancy on record       Passed - No positive pregnancy test in past 12 months        3 months of rx approved. Patient due for annual exam next month. Letter and mychart message sent.      Kirsten Shafer RN    "

## 2018-06-05 ENCOUNTER — OFFICE VISIT (OUTPATIENT)
Dept: URGENT CARE | Facility: URGENT CARE | Age: 38
End: 2018-06-05
Payer: COMMERCIAL

## 2018-06-05 VITALS
TEMPERATURE: 98.2 F | DIASTOLIC BLOOD PRESSURE: 93 MMHG | SYSTOLIC BLOOD PRESSURE: 131 MMHG | OXYGEN SATURATION: 98 % | HEART RATE: 80 BPM

## 2018-06-05 DIAGNOSIS — K92.1 BLOOD IN STOOL: Primary | ICD-10-CM

## 2018-06-05 LAB — HGB BLD-MCNC: 13.9 G/DL (ref 11.7–15.7)

## 2018-06-05 PROCEDURE — 85018 HEMOGLOBIN: CPT | Performed by: FAMILY MEDICINE

## 2018-06-05 PROCEDURE — 36415 COLL VENOUS BLD VENIPUNCTURE: CPT | Performed by: FAMILY MEDICINE

## 2018-06-05 PROCEDURE — 99213 OFFICE O/P EST LOW 20 MIN: CPT | Performed by: FAMILY MEDICINE

## 2018-06-05 NOTE — MR AVS SNAPSHOT
After Visit Summary   6/5/2018    Lynda Cohen    MRN: [id number]           Patient Information     Date Of Birth          1980        Visit Information        Provider Department      6/5/2018 6:30 PM Nell Ledesma MD [facility]        Today's Diagnoses     Blood in stool    -  1       Follow-ups after your visit        Future tests that were ordered for you today     Open Future Orders        Priority Expected Expires Ordered    Enteric Bacteria and Virus Panel by YUE Stool Routine  6/12/2018 6/5/2018            Who to contact     If you have questions or need follow up information about today's clinic visit or your schedule please contact [facility] directly at [phone].  Normal or non-critical lab and imaging results will be communicated to you by MyChart, letter or phone within 4 business days after the clinic has received the results. If you do not hear from us within 7 days, please contact the clinic through TapBlazehart or phone. If you have a critical or abnormal lab result, we will notify you by phone as soon as possible.  Submit refill requests through BackOps or call your pharmacy and they will forward the refill request to us. Please allow 3 business days for your refill to be completed.          Additional Information About Your Visit        MyChart Information     BackOps gives you secure access to your electronic health record. If you see a primary care provider, you can also send messages to your care team and make appointments. If you have questions, please call your primary care clinic.  If you do not have a primary care provider, please call [phone] and they will assist you.        Care EveryWhere ID     This is your Care EveryWhere ID. This could be used by other organizations to access your [facility] medical records  [id number]        Your Vitals Were     Pulse Temperature Pulse Oximetry             80 98.2  F (36.8  C) (Tympanic)  98%          Blood Pressure from Last 3 Encounters:   06/05/18 (!) 131/93   05/03/18 124/82   06/15/17 134/84    Weight from Last 3 Encounters:   05/03/18 141 lb (64 kg)   06/15/17 136 lb 9.6 oz (62 kg)   03/07/17 136 lb 1.6 oz (61.7 kg)              We Performed the Following     Hemoglobin        Primary Care Provider Office Phone # Fax #    Naveed Sadler [phone] [address] [place] [address]        Equal Access to Services     [facility]: Hadii aad ku hadasho Soomaali, waaxda luqadaha, qaybta kaalmada adeegyada, raya flood hayyarelis young . So [facility] [phone].    ATENCIÓN: Si habla español, tiene a ferrer disposición servicios gratuitos de asistencia lingüística. [facility] [phone].    We comply with applicable federal civil rights laws and [place] laws. We do not discriminate on the basis of race, color, national origin, age, disability, sex, sexual orientation, or gender identity.            Thank you!     Thank you for choosing [facility] [place] [facility]  for your care. Our goal is always to provide you with excellent care. Hearing back from our patients is one way we can continue to improve our services. Please take a few minutes to complete the written survey that you may receive in the mail after your visit with us. Thank you!             Your Updated Medication List - Protect others around you: Learn how to safely use, store and throw away your medicines at www.disposemymeds.org.          This list is accurate as of 6/5/18  9:49 PM.  Always use your most recent med list.                   Brand Name Dispense Instructions for use Diagnosis    ALLEGRA PO           NORTREL 0.5/35 (28) 0.5-35 MG-MCG per tablet   Generic drug:  norethindrone-ethinyl estradiol     112 tablet    TAKE 1 TABLET DAILY SKIP PLACEBO PILLS FOR 3 MONTHS    Encounter for initial prescription of contraceptive pills       sertraline 100 MG tablet    ZOLOFT    30 tablet     Take 2 tablets (200 mg) by mouth daily    Generalized anxiety disorder

## 2018-06-06 NOTE — PROGRESS NOTES
Lynda Cohen is a 38 year old female who presents to [facility] for evaluation of two episodes of blood mixed with stool today.  She has never had anything like this happen before.  The first episode was red blood mixed with a soft, formed stool.  The second was a darker clot along with formed stool.  She did have diarrhea yesterday.  No recent foreign travel.  No personal history of GI disease.  Hasn't had any vomiting.  Has had some mild stomach discomfort.  No urinary symptoms.    Recently restarted OCPs and has had vaginal bleeding for about two weeks.  Pt is certain that the blood she saw mixed with stool was rectal in origin, not vaginal.    No fevers.  Has been feeling otherwise well.  Appetite has been normal.    No family history of any GI issues that she knows of.    Past Medical History:   Diagnosis Date     Abnormal Pap smear 2002    colposcopy,      Hx of previous reproductive problem     clomid     Irregular menses      Umbilical cord short in labor and delivery, delivered 2012     Social History     Social History     Marital status:      Spouse name: Naveed     Number of children: 1     Years of education: N/A     Occupational History      [employer]     Social History Main Topics     Smoking status: Never Smoker     Smokeless tobacco: Never Used     Alcohol use No     Drug use: No     Sexual activity: Yes     Partners: Male     Other Topics Concern      Service No     Blood Transfusions No     Caffeine Concern Yes     Sensitive to caffeine     Occupational Exposure No     Hobby Hazards No     Sleep Concern No     Stress Concern No     Weight Concern No     Special Diet No     Back Care No     Exercise No     Bike Helmet Yes     Seat Belt Yes     Self-Exams No     Social History Narrative    Living arrangements - the patient lives with her family     ROS as noted above.      BP (!) 131/93 (BP Location: Right arm, Patient Position: Sitting, Cuff Size: Adult Regular)   Pulse 80  Temp 98.2  F (36.8  C) (Tympanic)  SpO2 98%  GEN: well-appearing, in NAD  ENT: oral MMM, normal pharynx, no intra-oral lesions  Neck;  Supple, no LAD  Lungs:  CTAB, good air entry bilaterally  CV:  RRR, no murmurs, normal S1 and S2  Abd: soft, NT, ND, active bowel sounds  Rectal: no masses noted, no hemorrhoids seen externally nor felt internally.  There was no gross blood within the rectal vault.    Results for orders placed or performed in visit on 06/05/18   Hemoglobin   Result Value Ref Range    Hemoglobin 13.9 11.7 - 15.7 g/dL     ASSESSMENT:  Blood in stool    PLAN:  Hemoglobin well within normal range, so unlikely significant internal bleeding happening.  Reassured pt regarding lab and exam findings.  If additional bleeding occurs, she should follow up with GI for additional evaluation, likely to include colonoscopy.  If diarrhea returns, pt is to return with a stool sample to rule out infectious causes that could cause bloody stools.

## 2018-07-27 DIAGNOSIS — Z30.011 ENCOUNTER FOR INITIAL PRESCRIPTION OF CONTRACEPTIVE PILLS: ICD-10-CM

## 2018-07-27 RX ORDER — NORETHINDRONE AND ETHINYL ESTRADIOL 0.5-0.035
KIT ORAL
Qty: 112 TABLET | Refills: 0 | Status: SHIPPED | OUTPATIENT
Start: 2018-07-27 | End: 2018-08-09

## 2018-07-27 NOTE — TELEPHONE ENCOUNTER
Prescription approved per [facility] Refill Protocol.  Pt had an appt scheduled for 8/9/18.  FLAKO Ivy RN

## 2018-08-09 ENCOUNTER — OFFICE VISIT (OUTPATIENT)
Dept: OBGYN | Facility: CLINIC | Age: 38
End: 2018-08-09
Payer: COMMERCIAL

## 2018-08-09 VITALS
DIASTOLIC BLOOD PRESSURE: 76 MMHG | HEART RATE: 84 BPM | SYSTOLIC BLOOD PRESSURE: 114 MMHG | WEIGHT: 142 LBS | BODY MASS INDEX: 27.88 KG/M2 | HEIGHT: 60 IN

## 2018-08-09 DIAGNOSIS — Z00.00 ROUTINE GENERAL MEDICAL EXAMINATION AT A HEALTH CARE FACILITY: Primary | ICD-10-CM

## 2018-08-09 DIAGNOSIS — Z30.011 ENCOUNTER FOR INITIAL PRESCRIPTION OF CONTRACEPTIVE PILLS: ICD-10-CM

## 2018-08-09 PROCEDURE — 36415 COLL VENOUS BLD VENIPUNCTURE: CPT | Performed by: OBSTETRICS & GYNECOLOGY

## 2018-08-09 PROCEDURE — 99395 PREV VISIT EST AGE 18-39: CPT | Performed by: OBSTETRICS & GYNECOLOGY

## 2018-08-09 PROCEDURE — 80061 LIPID PANEL: CPT | Performed by: OBSTETRICS & GYNECOLOGY

## 2018-08-09 NOTE — PROGRESS NOTES
SUBJECTIVE:                                                      Lynda is a 38 year old  female who presents for annual exam.     Patient's last menstrual period was 2018 (approximate).. Menses are every 3 months only with extended cycle oral contraceptive pills.  Using oral contraceptives for contraception.  She is not currently considering pregnancy. She is fairly certain that they do not desire more children.  Besides routine health maintenance, she has no other health concerns today. Family history of elevated lipids, will check fasting lipid panel today as well.  GYNECOLOGIC HISTORY:    Lynda is sexually active with 1 male partner(s) and is currently in a monogamous relationship.      History sexually transmitted infections:No STD history    History of abnormal Pap smear: NO - age 30-65 PAP every 5 years with negative HPV co-testing recommended  Family history of breast CA: No  Family history of uterine/ovarian CA: No    Family history of colon CA: No      HISTORY:  Obstetric History       T0      L1     SAB0   TAB0   Ectopic0   Multiple0   Live Births1       # Outcome Date GA Lbr Miguel Angel/2nd Weight Sex Delivery Anes PTL Lv   1  12 32w3d  3 lb 10 oz (1.645 kg) M CS-LTranv  Y SOFY      Name: Nicanor Lucio      Apgar1:  7                Apgar5: 9      Obstetric Comments   Delivered for recurrent bradycardia, decelerations     Past Medical History:   Diagnosis Date     Abnormal Pap smear     colposcopy,      Hx of previous reproductive problem     clomid     Irregular menses      Umbilical cord short in labor and delivery, delivered      Past Surgical History:   Procedure Laterality Date      SECTION  2012    Procedure:  SECTION;   Section.;  Surgeon: Juancarlos Lezama MD;  Location: [facility] L+D     Family History   Problem Relation Age of Onset     Lipids Mother      Lipids Brother      C.A.D. Maternal Grandfather      quintuple bypass.   CHF     Blood Disease Paternal Grandfather      Non-Hodgkins Lymphoma     Alzheimer Disease Maternal Aunt      late 50s     Diabetes Brother      Type 2     Social History     Social History     Marital status:      Spouse name: Naveed     Number of children: 1     Years of education: N/A     Occupational History      [employer]     Social History Main Topics     Smoking status: Never Smoker     Smokeless tobacco: Never Used     Alcohol use No     Drug use: No     Sexual activity: Yes     Partners: Male     Other Topics Concern      Service No     Blood Transfusions No     Caffeine Concern Yes     Sensitive to caffeine     Occupational Exposure No     Hobby Hazards No     Sleep Concern No     Stress Concern No     Weight Concern No     Special Diet No     Back Care No     Exercise No     Bike Helmet Yes     Seat Belt Yes     Self-Exams No     Social History Narrative    Living arrangements - the patient lives with her family       Current Outpatient Prescriptions:      Fexofenadine HCl (ALLEGRA PO), , Disp: , Rfl:      NORTREL 0.5/35, 28, 0.5-35 MG-MCG per tablet, TAKE 1 TABLET DAILY SKIP PLACEBO PILLS FOR 3 MONTHS, Disp: 112 tablet, Rfl: 0     sertraline (ZOLOFT) 100 MG tablet, Take 2 tablets (200 mg) by mouth daily, Disp: 30 tablet, Rfl: 0     Allergies   Allergen Reactions     Caffeine      Codeine Sulfate      Vicodin [Hydrocodone-Acetaminophen]        Past medical, surgical, social and family history were reviewed and updated in EPIC.    ROS:   12 point review of systems negative other than symptoms noted below.      OBJECTIVE:                                                      EXAM:  /76  Pulse 84  Ht 5' (1.524 m)  Wt 142 lb (64.4 kg)  LMP 07/16/2018 (Approximate)  Breastfeeding? No  BMI 27.73 kg/m2   BMI: Body mass index is 27.73 kg/(m^2).  General: Alert and oriented, no distress.  Psychiatric: Mood and affect within normal limits.  Skin: Warm and dry, no lesions,  rashes or discolorations.  Neck: Neck supple. Thyroid palpbably normal in size and without nodularity.  Cardiovascular: Regular rate and rhythm, no murmurs, rubs or gallops.   Lungs:  Clear to auscultation bilaterally, breathing is unlabored.  Breasts:  Symmetric, no skin changes.  No dominant masses bilaterally.   Lymph:  No cervical, supraclavicular, infraclavicular, axillary or inguinal lymphadenopathy palpable.   Abdomen: Soft, nontender, no hepatosplenomegaly, no rebound or guarding, no masses, no hernias.   Vulva:  No external lesions, normal female hair distribution, no inguinal adenopathy.    Urethra:  Midline, non-tender, well supported, no discharge  Vagina:  Well-estrogenized, no abnormal discharge, no lesions  Rectal Exam: deferred  Musculoskeletal: extremities normal      COUNSELING:   Reviewed preventive health counseling, as reflected in patient instructions       Regular exercise       Healthy diet/nutrition       Family planning   reports that she has never smoked. She has never used smokeless tobacco.        ASSESSMENT/PLAN:                                                      38 year old female with satisfactory annual exam  (Z00.00) Routine general medical examination at a health care facility  (primary encounter diagnosis)  Comment:   Plan: Lipid Profile        Pap and HPV next year. Lipids today. Check lipids every 3 years given her family history.    (Z30.011) Encounter for initial prescription of contraceptive pills  Comment:   Plan: norethindrone-ethinyl estradiol (NORTREL         0.5/35, 28,) 0.5-35 MG-MCG per tablet        Refilled for one year.      Verito Sanchez MD

## 2018-08-09 NOTE — MR AVS SNAPSHOT
After Visit Summary   8/9/2018    Lynda Cohen    MRN: [id number]           Patient Information     Date Of Birth          1980        Visit Information        Provider Department      8/9/2018 10:00 AM Verito Sanchez MD [facility]        Today's Diagnoses     Routine general medical examination at a health care facility    -  1    Encounter for initial prescription of contraceptive pills           Follow-ups after your visit        Who to contact     If you have questions or need follow up information about today's clinic visit or your schedule please contact [facility] directly at [phone].  Normal or non-critical lab and imaging results will be communicated to you by Voxelhart, letter or phone within 4 business days after the clinic has received the results. If you do not hear from us within 7 days, please contact the clinic through Voxelhart or phone. If you have a critical or abnormal lab result, we will notify you by phone as soon as possible.  Submit refill requests through Good Deal or call your pharmacy and they will forward the refill request to us. Please allow 3 business days for your refill to be completed.          Additional Information About Your Visit        MyChart Information     Good Deal gives you secure access to your electronic health record. If you see a primary care provider, you can also send messages to your care team and make appointments. If you have questions, please call your primary care clinic.  If you do not have a primary care provider, please call [phone] and they will assist you.        Care EveryWhere ID     This is your Care EveryWhere ID. This could be used by other organizations to access your [facility] medical records  [id number]        Your Vitals Were     Pulse Height Last Period Breastfeeding? BMI (Body Mass Index)       84 5' (1.524 m) 07/16/2018 (Approximate) No 27.73 kg/m2        Blood Pressure from Last 3  Encounters:   08/09/18 114/76   06/05/18 (!) 131/93   05/03/18 124/82    Weight from Last 3 Encounters:   08/09/18 142 lb (64.4 kg)   05/03/18 141 lb (64 kg)   06/15/17 136 lb 9.6 oz (62 kg)              We Performed the Following     Lipid Profile          Where to get your medicines      These medications were sent to [facility] [place] [id number] [facility]  [address] [address]     Phone:  [phone]     norethindrone-ethinyl estradiol 0.5-35 MG-MCG per tablet          Primary Care Provider Office Phone # Fax #    Naveed Sadler [phone] [address] [place] [address]        Equal Access to Services     [facility]: Hadii maryjo gallardo hadasho Sojuan ramon, waaxda luqadaha, qaybta kaalmada janet, raya young . So [facility] [phone].    ATENCIÓN: Si habla español, tiene a ferrer disposición servicios gratuitos de asistencia lingüística. [facility] [phone].    We comply with applicable federal civil rights laws and [place] laws. We do not discriminate on the basis of race, color, national origin, age, disability, sex, sexual orientation, or gender identity.            Thank you!     Thank you for choosing [facility]  for your care. Our goal is always to provide you with excellent care. Hearing back from our patients is one way we can continue to improve our services. Please take a few minutes to complete the written survey that you may receive in the mail after your visit with us. Thank you!             Your Updated Medication List - Protect others around you: Learn how to safely use, store and throw away your medicines at www.disposemymeds.org.          This list is accurate as of 8/9/18 10:45 AM.  Always use your most recent med list.                   Brand Name Dispense Instructions for use Diagnosis    ALLEGRA PO           norethindrone-ethinyl estradiol 0.5-35 MG-MCG per  tablet    NORTREL 0.5/35 (96)    112 tablet    TAKE 1 TABLET DAILY SKIP PLACEBO PILLS FOR 3 MONTHS    Encounter for initial prescription of contraceptive pills       sertraline 100 MG tablet    ZOLOFT    30 tablet    Take 2 tablets (200 mg) by mouth daily    Generalized anxiety disorder

## 2018-08-09 NOTE — NURSING NOTE
Chief Complaint   Patient presents with     Physical       Initial /76  Pulse 84  Ht 5' (1.524 m)  Wt 142 lb (64.4 kg)  LMP 2018 (Approximate)  Breastfeeding? No  BMI 27.73 kg/m2 Estimated body mass index is 27.73 kg/(m^2) as calculated from the following:    Height as of this encounter: 5' (1.524 m).    Weight as of this encounter: 142 lb (64.4 kg).  BP completed using cuff size: regular        The following HM Due: NONE  Lab Results   Component Value Date    PAP NIL 2014     Negative HPV co-testing.    Payton Tellez LPN

## 2018-08-10 LAB
CHOLEST SERPL-MCNC: 257 MG/DL
HDLC SERPL-MCNC: 77 MG/DL
LDLC SERPL CALC-MCNC: 158 MG/DL
NONHDLC SERPL-MCNC: 180 MG/DL
TRIGL SERPL-MCNC: 110 MG/DL

## 2019-04-14 ENCOUNTER — OFFICE VISIT (OUTPATIENT)
Dept: URGENT CARE | Facility: URGENT CARE | Age: 39
End: 2019-04-14
Payer: COMMERCIAL

## 2019-04-14 VITALS
RESPIRATION RATE: 16 BRPM | BODY MASS INDEX: 27.03 KG/M2 | HEART RATE: 78 BPM | TEMPERATURE: 98 F | SYSTOLIC BLOOD PRESSURE: 132 MMHG | WEIGHT: 138.4 LBS | DIASTOLIC BLOOD PRESSURE: 88 MMHG

## 2019-04-14 DIAGNOSIS — J02.0 STREPTOCOCCAL PHARYNGITIS: Primary | ICD-10-CM

## 2019-04-14 DIAGNOSIS — J02.9 ACUTE PHARYNGITIS, UNSPECIFIED ETIOLOGY: ICD-10-CM

## 2019-04-14 LAB
DEPRECATED S PYO AG THROAT QL EIA: ABNORMAL
SPECIMEN SOURCE: ABNORMAL

## 2019-04-14 PROCEDURE — 87880 STREP A ASSAY W/OPTIC: CPT | Performed by: PHYSICIAN ASSISTANT

## 2019-04-14 PROCEDURE — 99213 OFFICE O/P EST LOW 20 MIN: CPT | Performed by: PHYSICIAN ASSISTANT

## 2019-04-14 RX ORDER — AMOXICILLIN 875 MG
875 TABLET ORAL 2 TIMES DAILY
Qty: 20 TABLET | Refills: 0 | Status: SHIPPED | OUTPATIENT
Start: 2019-04-14 | End: 2019-08-15

## 2019-04-14 NOTE — PROGRESS NOTES
SUBJECTIVE:    Lynda Cohen is a 39 y.o. Woman who presents to  today for evaluation of ST and concern for Strep.     HPI: Patient reports she was dx with Strep approx 3 weeks ago @ a [facility]. Patient was reportedly tx with Pen VK. States sxs did clear. She developed nasal congestion, runny nose and mild cough approximately one week ago. Patient thought her viral URI sxs were improving but ST developed Friday night (approximately 36 hours ago).  Denies any fever.     ROS:     HEENT: Positive ST and nasal congestion.   RESP: Positive cough as per above. Despite cough, denies any severe SOB.  Denies any hx of asthma or RAD.   GI: Denies any N/V/D. No abdominal pain. Normal BM's  SKIN: Denies rash  NEURO:  Positive mild, waxing and waning generalized HA as per above. Denies any severe headaches, neck stiffness, photophobia, rash, mental status changes or lethargy.   URINARY: Reports good PO fluid intake and normal UOP.  Denies any dysuria or UTI sxs.      Past Medical History:   Diagnosis Date     Abnormal Pap smear 2002    colposcopy,      Hx of previous reproductive problem     clomid     Irregular menses      Umbilical cord short in labor and delivery, delivered 2012     Current Outpatient Medications   Medication     Fexofenadine HCl (ALLEGRA PO)     norethindrone-ethinyl estradiol (NORTREL 0.5/35, 28,) 0.5-35 MG-MCG per tablet     sertraline (ZOLOFT) 100 MG tablet     No current facility-administered medications for this visit.      Allergies   Allergen Reactions     Caffeine      Codeine Sulfate      Vicodin [Hydrocodone-Acetaminophen]        OBJECTIVE:  /88   Pulse 78   Temp 98  F (36.7  C) (Tympanic)   Resp 16   Wt 62.8 kg (138 lb 6.4 oz)   BMI 27.03 kg/m      General appearance: alert and no apparent distress  Skin color is pink and without rash.  HEENT:   Conjunctiva not injected.  Sclera clear.  Left TM is normal: no effusions, no erythema, and normal landmarks.  Right TM is normal:  "no effusions, no erythema, and normal landmarks.  Nasal mucosa is congested   Oropharyngeal exam is positive for mild, diffuse, erythema.  No plaque, exudate, lesions, or ulcers.   Neck is supple, FROM with no adenopathy  CARDIAC:NORMAL - regular rate and rhythm without murmur.  RESP: Normal - CTA without rales, rhonchi, or wheezing.  NEURO: Alert and oriented.  Normal speech and mentation.  CN II/XII grossly intact.  Gait within normal limits.        Component      Latest Ref Rng & Units 4/14/2019   Specimen Description       Throat   Rapid Strep A Screen       POSITIVE: Group A Streptococcal antigen detected by immunoassay. (A)       ASSESSMENT/PLAN:    (J02.0) Streptococcal pharyngitis  (primary encounter diagnosis)  Plan: amoxicillin (AMOXIL) 875 MG tablet    1. Abx as per above   2. Change toothbrush and home fomite disinfect advised   3. Follow-up with PCP if sxs change, worsen or fail to fully resolve with above tx.  4.  In addition to the above, Strep \"red flag\" signs and sxs are reviewed with p verbally.    Pt verbalizes understanding of and agrees to the above plan.        (J02.9) Acute pharyngitis, unspecified etiology  Plan: Strep, Rapid Screen          "

## 2019-05-16 ENCOUNTER — OFFICE VISIT (OUTPATIENT)
Dept: INTERNAL MEDICINE | Facility: CLINIC | Age: 39
End: 2019-05-16
Payer: COMMERCIAL

## 2019-05-16 VITALS
SYSTOLIC BLOOD PRESSURE: 120 MMHG | HEART RATE: 86 BPM | OXYGEN SATURATION: 97 % | BODY MASS INDEX: 28.2 KG/M2 | TEMPERATURE: 98.2 F | WEIGHT: 144.4 LBS | DIASTOLIC BLOOD PRESSURE: 70 MMHG | RESPIRATION RATE: 16 BRPM

## 2019-05-16 DIAGNOSIS — F42.9 OBSESSIVE-COMPULSIVE DISORDER, UNSPECIFIED TYPE: Primary | ICD-10-CM

## 2019-05-16 PROCEDURE — 99213 OFFICE O/P EST LOW 20 MIN: CPT | Performed by: INTERNAL MEDICINE

## 2019-05-16 RX ORDER — SERTRALINE HYDROCHLORIDE 100 MG/1
200 TABLET, FILM COATED ORAL DAILY
Qty: 60 TABLET | Refills: 11 | Status: SHIPPED | OUTPATIENT
Start: 2019-05-16 | End: 2020-05-13

## 2019-05-16 ASSESSMENT — ANXIETY QUESTIONNAIRES
6. BECOMING EASILY ANNOYED OR IRRITABLE: NOT AT ALL
7. FEELING AFRAID AS IF SOMETHING AWFUL MIGHT HAPPEN: NOT AT ALL
IF YOU CHECKED OFF ANY PROBLEMS ON THIS QUESTIONNAIRE, HOW DIFFICULT HAVE THESE PROBLEMS MADE IT FOR YOU TO DO YOUR WORK, TAKE CARE OF THINGS AT HOME, OR GET ALONG WITH OTHER PEOPLE: NOT DIFFICULT AT ALL
5. BEING SO RESTLESS THAT IT IS HARD TO SIT STILL: NOT AT ALL
2. NOT BEING ABLE TO STOP OR CONTROL WORRYING: NOT AT ALL
1. FEELING NERVOUS, ANXIOUS, OR ON EDGE: NOT AT ALL
3. WORRYING TOO MUCH ABOUT DIFFERENT THINGS: NOT AT ALL
GAD7 TOTAL SCORE: 0

## 2019-05-16 ASSESSMENT — PATIENT HEALTH QUESTIONNAIRE - PHQ9: 5. POOR APPETITE OR OVEREATING: NOT AT ALL

## 2019-05-16 NOTE — PROGRESS NOTES
SUBJECTIVE:   Lynda Cohen is a 39 year old female who presents to clinic today for the following   health issues:      Anxiety Follow-Up    Status since last visit: No change    Other associated symptoms:None    Complicating factors:   Significant life event: No   Current substance abuse: None  Depression symptoms: No  VIKKI-7 SCORE 3/7/2017 5/3/2018 5/16/2019   Total Score 0 0 0       VIKKI-7    Amount of exercise or physical activity: None    Problems taking medications regularly: No    Medication side effects: none    Diet: regular (no restrictions)            Additional history: as documented    Reviewed  and updated as needed this visit by clinical staff  Tobacco  Allergies  Meds  Problems  Med Hx  Surg Hx  Fam Hx         Reviewed and updated as needed this visit by Provider  Tobacco  Allergies  Meds  Problems  Med Hx  Surg Hx  Fam Hx         Patient is here to obtain refill of her high-dose sertraline, which she uses for OCD.  Has been on this dose for a number of years, very successfully.  No other acute issues to discuss today.    ROS:  Constitutional, HEENT, cardiovascular, pulmonary, gi and gu systems are negative, except as otherwise noted.    OBJECTIVE:     /70 (BP Location: Left arm, Patient Position: Chair, Cuff Size: Adult Regular)   Pulse 86   Temp 98.2  F (36.8  C) (Oral)   Resp 16   Wt 65.5 kg (144 lb 6.4 oz)   LMP 04/04/2019 (Approximate)   SpO2 97%   BMI 28.20 kg/m    Body mass index is 28.2 kg/m .  GENERAL: healthy, alert and no distress  NECK: no adenopathy, no asymmetry, masses, or scars and thyroid normal to palpation  RESP: lungs clear to auscultation - no rales, rhonchi or wheezes  CV: regular rate and rhythm, normal S1 S2, no S3 or S4, no murmur, click or rub, no peripheral edema and peripheral pulses strong  ABDOMEN: soft, nontender, no hepatosplenomegaly, no masses and bowel sounds normal  MS: no gross musculoskeletal defects noted, no  edema        ASSESSMENT/PLAN:     1. Obsessive-compulsive disorder, unspecified type  Continue sertraline as currently.  Follow-up as needed.  - sertraline (ZOLOFT) 100 MG tablet; Take 2 tablets (200 mg) by mouth daily  Dispense: 60 tablet; Refill: 11    See Patient Instructions    Juan Miguel Pearson MD  [facility]

## 2019-05-17 ASSESSMENT — ANXIETY QUESTIONNAIRES: GAD7 TOTAL SCORE: 0

## 2019-06-20 DIAGNOSIS — Z30.011 ENCOUNTER FOR INITIAL PRESCRIPTION OF CONTRACEPTIVE PILLS: ICD-10-CM

## 2019-06-20 NOTE — PROGRESS NOTES
Pt calling to change her pharmacy. 3 month supply of her OCP sent to new pharmacy. Pt aware she is due for annual exam in August. Sent to scheduling line to make appt.        Kirsten Shafer RN

## 2019-08-15 ENCOUNTER — OFFICE VISIT (OUTPATIENT)
Dept: OBGYN | Facility: CLINIC | Age: 39
End: 2019-08-15
Payer: COMMERCIAL

## 2019-08-15 VITALS
DIASTOLIC BLOOD PRESSURE: 78 MMHG | WEIGHT: 141 LBS | HEART RATE: 96 BPM | BODY MASS INDEX: 27.68 KG/M2 | SYSTOLIC BLOOD PRESSURE: 114 MMHG | HEIGHT: 60 IN

## 2019-08-15 DIAGNOSIS — Z30.011 ENCOUNTER FOR INITIAL PRESCRIPTION OF CONTRACEPTIVE PILLS: ICD-10-CM

## 2019-08-15 DIAGNOSIS — Z12.4 PAP SMEAR FOR CERVICAL CANCER SCREENING: ICD-10-CM

## 2019-08-15 DIAGNOSIS — Z00.00 ROUTINE GENERAL MEDICAL EXAMINATION AT A HEALTH CARE FACILITY: Primary | ICD-10-CM

## 2019-08-15 PROCEDURE — 87624 HPV HI-RISK TYP POOLED RSLT: CPT | Performed by: OBSTETRICS & GYNECOLOGY

## 2019-08-15 PROCEDURE — 99395 PREV VISIT EST AGE 18-39: CPT | Performed by: OBSTETRICS & GYNECOLOGY

## 2019-08-15 PROCEDURE — 99207 ZZC PRENATAL VISIT: CPT | Performed by: OBSTETRICS & GYNECOLOGY

## 2019-08-15 PROCEDURE — G0145 SCR C/V CYTO,THINLAYER,RESCR: HCPCS | Performed by: OBSTETRICS & GYNECOLOGY

## 2019-08-15 ASSESSMENT — MIFFLIN-ST. JEOR: SCORE: 1246.07

## 2019-08-15 NOTE — NURSING NOTE
Chief Complaint   Patient presents with     Physical     Pap/HPV due.        Initial /78   Pulse 96   Ht 1.524 m (5')   Wt 64 kg (141 lb)   LMP 2019 (Approximate)   Breastfeeding? No   BMI 27.54 kg/m   Estimated body mass index is 27.54 kg/m  as calculated from the following:    Height as of this encounter: 1.524 m (5').    Weight as of this encounter: 64 kg (141 lb).  BP completed using cuff size: regular    Questioned patient about current smoking habits.  Pt. has never smoked.          The following HM Due: pap smear      The following patient reported/Care Every where data was sent to:  P ABSTRACT QUALITY INITIATIVES [[id number]]      Patient has appointment for today. Payton Tellez LPN

## 2019-08-15 NOTE — PROGRESS NOTES
SUBJECTIVE:                                                      Lynda is a 39 year old  female who presents for annual exam.     Patient's last menstrual period was 2019 (approximate).. Menses are regular q 28-30 days and normal lasting 4-5 days.  Using oral contraceptives for contraception.  She is not currently considering pregnancy.  Besides routine health maintenance, she has no other health concerns today .  GYNECOLOGIC HISTORY:     Lynda is sexually active with 1 male partner(s) and is currently in a monogamous relationship.       History sexually transmitted infections:No STD history     History of abnormal Pap smear: NO - age 30-65 PAP every 5 years with negative HPV co-testing recommended  Family history of breast CA: Yes, 2 maternal aunts, postmenopausal  Family history of uterine/ovarian CA: No     Family history of colon CA: No    HISTORY:  OB History    Para Term  AB Living   1 1 0 1 0 1   SAB TAB Ectopic Multiple Live Births   0 0 0 0 1      # Outcome Date GA Lbr Miguel Angel/2nd Weight Sex Delivery Anes PTL Lv   1  12 32w3d  1.645 kg (3 lb 10 oz) M CS-LTranv  Y SOFY      Birth Comments: short umbilical cord      Name: Nicanor Lucio      Apgar1: 7  Apgar5: 9      Obstetric Comments   Delivered for recurrent bradycardia, decelerations     Past Medical History:   Diagnosis Date     Abnormal Pap smear     colposcopy,      Hx of previous reproductive problem     clomid     Irregular menses      Umbilical cord short in labor and delivery, delivered      Past Surgical History:   Procedure Laterality Date      SECTION  2012    Procedure:  SECTION;   Section.;  Surgeon: Juancarlos Lezama MD;  Location: [facility]     Family History   Problem Relation Age of Onset     Lipids Mother      Lipids Brother      C.A.D. Maternal Grandfather         quintuple bypass.  CHF     Blood Disease Paternal Grandfather         Non-Hodgkins Lymphoma      Alzheimer Disease Maternal Aunt         late 50s     Breast Cancer Maternal Aunt      Diabetes Brother         Type 2     Social History     Socioeconomic History     Marital status:      Spouse name: Naveed     Number of children: 1     Years of education: Not on file     Highest education level: Not on file   Occupational History     Occupation:      Employer: [employer]   Social Needs     Financial resource strain: Not on file     Food insecurity:     Worry: Not on file     Inability: Not on file     Transportation needs:     Medical: Not on file     Non-medical: Not on file   Tobacco Use     Smoking status: Never Smoker     Smokeless tobacco: Never Used   Substance and Sexual Activity     Alcohol use: No     Drug use: No     Sexual activity: Yes     Partners: Male   Lifestyle     Physical activity:     Days per week: Not on file     Minutes per session: Not on file     Stress: Not on file   Relationships     Social connections:     Talks on phone: Not on file     Gets together: Not on file     Attends Gnosticist service: Not on file     Active member of club or organization: Not on file     Attends meetings of clubs or organizations: Not on file     Relationship status: Not on file     Intimate partner violence:     Fear of current or ex partner: Not on file     Emotionally abused: Not on file     Physically abused: Not on file     Forced sexual activity: Not on file   Other Topics Concern     Parent/sibling w/ CABG, MI or angioplasty before 65F 55M? Not Asked      Service No     Blood Transfusions No     Caffeine Concern Yes     Comment: Sensitive to caffeine     Occupational Exposure No     Hobby Hazards No     Sleep Concern No     Stress Concern No     Weight Concern No     Special Diet No     Back Care No     Exercise No     Bike Helmet Yes     Seat Belt Yes     Self-Exams No   Social History Narrative    Living arrangements - the patient lives with her family       Current  Outpatient Medications:      norethindrone-ethinyl estradiol (NORTREL 0.5/35, 28,) 0.5-35 MG-MCG tablet, TAKE 1 TABLET DAILY SKIP PLACEBO PILLS FOR 3 MONTHS, Disp: 112 tablet, Rfl: 3     Fexofenadine HCl (ALLEGRA PO), , Disp: , Rfl:      sertraline (ZOLOFT) 100 MG tablet, Take 2 tablets (200 mg) by mouth daily, Disp: 60 tablet, Rfl: 11     Allergies   Allergen Reactions     Caffeine      Codeine Sulfate      Vicodin [Hydrocodone-Acetaminophen]        Past medical, surgical, social and family history were reviewed and updated in EPIC.    ROS:   12 point review of systems negative other than symptoms noted below.      OBJECTIVE:                                                      EXAM:  /78   Pulse 96   Ht 1.524 m (5')   Wt 64 kg (141 lb)   LMP 07/30/2019 (Approximate)   Breastfeeding? No   BMI 27.54 kg/m     BMI: Body mass index is 27.54 kg/m .  General: Alert and oriented, no distress.  Psychiatric: Mood and affect within normal limits.  Skin: Warm and dry, no lesions, rashes or discolorations.  Neck: Neck supple. Thyroid palpbably normal in size and without nodularity.  Cardiovascular: Regular rate and rhythm, no murmurs, rubs or gallops.   Lungs:  Clear to auscultation bilaterally, breathing is unlabored.  Breasts:  Symmetric, no skin changes.  No dominant masses bilaterally.   Lymph:  No cervical, supraclavicular, infraclavicular, axillary or inguinal lymphadenopathy palpable.   Abdomen: Soft, nontender, no hepatosplenomegaly, no rebound or guarding, no masses, no hernias.   Vulva:  No external lesions, normal female hair distribution, no inguinal adenopathy.    Urethra:  Midline, non-tender, well supported, no discharge  Vagina:  Well-estrogenized, no abnormal discharge, no lesions  Cervix: no lesions, no discharge  Uterus:  anteverted, smooth contour, without enlargement, mobile, and without tenderness  Ovaries:  No masses appreciated, non-tender, mobile  Rectal Exam: deferred  Musculoskeletal:  extremities normal      COUNSELING:   Reviewed preventive health counseling, as reflected in patient instructions       Regular exercise       Healthy diet/nutrition   reports that she has never smoked. She has never used smokeless tobacco.        ASSESSMENT/PLAN:                                                      39 year old female with satisfactory annual exam  (Z00.00) Routine general medical examination at a health care facility  (primary encounter diagnosis)  Comment:   Plan: up to date with . Pap and HPV today. Follow up yearly for exams.     (Z30.011) Encounter for initial prescription of contraceptive pills  Comment:   Plan: norethindrone-ethinyl estradiol (NORTREL         0.5/35, 28,) 0.5-35 MG-MCG tablet        Refilled for one year.    Discussed family history of breast cancer--BRCA status unknown. She will discuss with her mother and follow up as needed if interested in genetic counseling.      Verito Sanchez MD

## 2019-08-20 LAB
COPATH REPORT: NORMAL
PAP: NORMAL

## 2019-08-21 LAB
FINAL DIAGNOSIS: NORMAL
HPV HR 12 DNA CVX QL NAA+PROBE: NEGATIVE
HPV16 DNA SPEC QL NAA+PROBE: NEGATIVE
HPV18 DNA SPEC QL NAA+PROBE: NEGATIVE
SPECIMEN DESCRIPTION: NORMAL
SPECIMEN SOURCE CVX/VAG CYTO: NORMAL

## 2020-03-01 ENCOUNTER — HEALTH MAINTENANCE LETTER (OUTPATIENT)
Age: 40
End: 2020-03-01

## 2020-03-09 ENCOUNTER — TELEPHONE (OUTPATIENT)
Dept: OBGYN | Facility: CLINIC | Age: 40
End: 2020-03-09

## 2020-03-09 NOTE — TELEPHONE ENCOUNTER
"Call made to pt.  Started OCP's 3/1/20.  Period started last week in Feb and is till bleeding. (this is 2nd week)  Changing tampon every 6 hrs.   (light bleeding)  Keeps expecting it to go away and it is not.  \" I am panicking\" I leave for [place] in 2 wks\"    Stops ocp to get a period every 3 months.  Tried to reassure pt that not totally abnormal to have a longer that normal period occasionally.  Would like to know if she can take extra pills to stop bleeding.    Please advise.  Francisca Ferguson, RN      "

## 2020-03-10 NOTE — TELEPHONE ENCOUNTER
Take a 4 day break from the pills then restart. As long as she has not had another pill free interval this month, she will not need back up birth control. This should help get her back on track before her trip.    Verito Sanchez MD

## 2020-04-03 ENCOUNTER — VIRTUAL VISIT (OUTPATIENT)
Dept: OBGYN | Facility: CLINIC | Age: 40
End: 2020-04-03
Payer: COMMERCIAL

## 2020-04-03 DIAGNOSIS — N92.1 METRORRHAGIA: ICD-10-CM

## 2020-04-03 DIAGNOSIS — Z30.011 ENCOUNTER FOR INITIAL PRESCRIPTION OF CONTRACEPTIVE PILLS: Primary | ICD-10-CM

## 2020-04-03 PROCEDURE — 99213 OFFICE O/P EST LOW 20 MIN: CPT | Mod: TEL | Performed by: OBSTETRICS & GYNECOLOGY

## 2020-04-03 NOTE — NURSING NOTE
"Lynda Cohen is a 40 year old female who is being evaluated via a billable telephone visit.      The patient has been notified of following:     \"This telephone visit will be conducted via a call between you and your physician/provider. We have found that certain health care needs can be provided without the need for a physical exam.  This service lets us provide the care you need with a short phone conversation.  If a prescription is necessary we can send it directly to your pharmacy.  If lab work is needed we can place an order for that and you can then stop by our lab to have the test done at a later time.    If during the course of the call the physician/provider feels a telephone visit is not appropriate, you will not be charged for this service.\"     Patient has given verbal consent for Telephone visit?  Yes    Lynda Cohen complains of    Chief Complaint   Patient presents with     Contraception     Currently taking OCP and has had irregular bleeding for approx. 1-2 months. Occasionally cramping with bleeding. Currently bleeding x 1 week and into 3rd week of pill pack.       I have reviewed and updated the patient's Past Medical History, Social History, Family History and Medication List.    ALLERGIES  Caffeine; Codeine sulfate; and Vicodin [hydrocodone-acetaminophen]    Payton Tellez LPN      "

## 2020-04-03 NOTE — PROGRESS NOTES
SUBJECTIVE:                                                   Lynda Cohen is a 40 year old female who presents to clinic today to follow up for irregular bleeding on oral contraceptive pills. Please see my last note for complete details.    In February, started bleeding/spotting before the scheduled week of bleeding (uses continuously). Took one week off, then restarted. Kept bleeding. Took another 4 day break to see if this would help. It didn't help and she kept bleeding lightly. She has been having light bleeding now almost continuously for a couple of months, and wants to know how to stop it.    She also asks about bilateral tubal ligation and vasectomy, and the differences in risk, etc for the procedures in question. Not planning more kids.      Problem list and histories reviewed & adjusted, as indicated.  Additional history: as documented.    Patient Active Problem List   Diagnosis     Generalized anxiety disorders     Blood type A+     Obsessive-compulsive disorder, unspecified type     Past Surgical History:   Procedure Laterality Date      SECTION  2012    Procedure:  SECTION;   Section.;  Surgeon: Juancarlos Lezama MD;  [facility]      Social History     Tobacco Use     Smoking status: Never Smoker     Smokeless tobacco: Never Used   Substance Use Topics     Alcohol use: No      Problem (# of Occurrences) Relation (Name,Age of Onset)    Alzheimer Disease (1) Maternal Aunt: late 50s    Blood Disease (1) Paternal Grandfather: Non-Hodgkins Lymphoma    Breast Cancer (1) Maternal Aunt    C.A.D. (1) Maternal Grandfather: quintuple bypass.  CHF    Diabetes (1) Brother: Type 2    Lipids (2) Mother, Brother            Fexofenadine HCl (ALLEGRA PO),   norethindrone-ethinyl estradiol (NORTREL 0.5/35, 28,) 0.5-35 MG-MCG tablet, TAKE 1 TABLET DAILY SKIP PLACEBO PILLS FOR 3 MONTHS  sertraline (ZOLOFT) 100 MG tablet, Take 2 tablets (200 mg) by mouth daily    No current  facility-administered medications on file prior to visit.     Allergies   Allergen Reactions     Caffeine      Codeine Sulfate      Vicodin [Hydrocodone-Acetaminophen]        ROS:  5 point ROS negative except as noted above in HPI, including Gen., Resp., CV, GI &  system review.    OBJECTIVE:     No exam was necessary today as this was entirely a counseling appointment.    In-Clinic Test Results:  No results found for this or any previous visit (from the past 24 hour(s)).    ASSESSMENT/PLAN:                                                        ICD-10-CM    1. Encounter for initial prescription of contraceptive pills  Z30.011    2. Metrorrhagia  N92.1          Will plan to switch oral contraceptive pills for now, take monthly/cyclic for 3 months then go back to extended cycle regimen. Discussed risks, benefits, and alternatives to bilateral tubal ligation, also discussed vasectomy. She will discuss with her partner as well. They are leaning toward permanent sterilization in which case she would stop the oral contraceptive pills.    15 mins phone time    Verito Sanchez MD  [facility]

## 2020-04-21 ENCOUNTER — TELEPHONE (OUTPATIENT)
Dept: OBGYN | Facility: CLINIC | Age: 40
End: 2020-04-21

## 2020-04-21 DIAGNOSIS — N92.1 METRORRHAGIA: Primary | ICD-10-CM

## 2020-04-21 RX ORDER — DESOGESTREL AND ETHINYL ESTRADIOL 0.15-0.03
1 KIT ORAL DAILY
Qty: 84 TABLET | Refills: 3 | Status: SHIPPED | OUTPATIENT
Start: 2020-04-21 | End: 2021-01-19

## 2020-04-21 NOTE — TELEPHONE ENCOUNTER
[facility] calling to clarify OCP.  Pt was expecting a different medication, but the same Rx was sent in as she has had in the past.  Pt confused on what she is supposed to be doing and if the medication is right.    Please advise.    Ragini Sanon RN

## 2020-05-12 NOTE — PROGRESS NOTES
"Lynda Cohen is a 40 year old female who is being evaluated via a billable telephone visit.      The patient has been notified of following:     \"This telephone visit will be conducted via a call between you and your physician/provider. We have found that certain health care needs can be provided without the need for a physical exam.  This service lets us provide the care you need with a short phone conversation.  If a prescription is necessary we can send it directly to your pharmacy.  If lab work is needed we can place an order for that and you can then stop by our lab to have the test done at a later time.    Telephone visits are billed at different rates depending on your insurance coverage. During this emergency period, for some insurers they may be billed the same as an in-person visit.  Please reach out to your insurance provider with any questions.    If during the course of the call the physician/provider feels a telephone visit is not appropriate, you will not be charged for this service.\"    Patient has given verbal consent for Telephone visit?  Yes    What phone number would you like to be contacted at? [phone]    How would you like to obtain your AVS? MyChart    Subjective     Lynda Cohen is a 40 year old female who presents to clinic today for the following health issues:    HPI    Medication Followup of sertraline (ZOLOFT) 100 MG tablet    Taking Medication as prescribed: yes    Side Effects:  None    Medication Helping Symptoms:  yes               Patient needs renewal of her sertraline, which she has been on for almost 20 years for control of OCD symptoms.  Medication is working quite well, she has no other issues to discuss today.    Reviewed and updated as needed this visit by Provider         Review of Systems   Constitutional, HEENT, cardiovascular, pulmonary, GI, , musculoskeletal, neuro, skin, endocrine and psych systems are negative, except as otherwise noted.   "     Objective   Reported vitals:  Wt 63.5 kg (140 lb)   LMP 04/22/2020 (Approximate)   Breastfeeding No   BMI 27.34 kg/m     healthy, alert and no distress  PSYCH: Alert and oriented times 3; coherent speech, normal   rate and volume, able to articulate logical thoughts, able   to abstract reason, no tangential thoughts, no hallucinations   or delusions  Her affect is normal  RESP: No cough, no audible wheezing, able to talk in full sentences  Remainder of exam unable to be completed due to telephone visits    Diagnostic Test Results:  Labs reviewed in Epic        Assessment/Plan:  1. Obsessive-compulsive disorder, unspecified type  Continue sertraline as currently, refills for the next year granted.  - sertraline (ZOLOFT) 100 MG tablet; Take 2 tablets (200 mg) by mouth daily  Dispense: 180 tablet; Refill: 3    No follow-ups on file.      Phone call duration:  5 minutes    Juan Miguel Pearson MD

## 2020-05-13 ENCOUNTER — VIRTUAL VISIT (OUTPATIENT)
Dept: INTERNAL MEDICINE | Facility: CLINIC | Age: 40
End: 2020-05-13
Payer: COMMERCIAL

## 2020-05-13 VITALS — BODY MASS INDEX: 27.34 KG/M2 | WEIGHT: 140 LBS

## 2020-05-13 DIAGNOSIS — F42.9 OBSESSIVE-COMPULSIVE DISORDER, UNSPECIFIED TYPE: ICD-10-CM

## 2020-05-13 PROCEDURE — 99213 OFFICE O/P EST LOW 20 MIN: CPT | Mod: TEL | Performed by: INTERNAL MEDICINE

## 2020-05-13 RX ORDER — SERTRALINE HYDROCHLORIDE 100 MG/1
200 TABLET, FILM COATED ORAL DAILY
Qty: 180 TABLET | Refills: 3 | Status: SHIPPED | OUTPATIENT
Start: 2020-05-13 | End: 2021-02-18

## 2020-12-14 ENCOUNTER — HEALTH MAINTENANCE LETTER (OUTPATIENT)
Age: 40
End: 2020-12-14

## 2021-01-18 ENCOUNTER — TELEPHONE (OUTPATIENT)
Dept: OBGYN | Facility: CLINIC | Age: 41
End: 2021-01-18

## 2021-01-18 DIAGNOSIS — N92.1 METRORRHAGIA: ICD-10-CM

## 2021-01-18 NOTE — TELEPHONE ENCOUNTER
Reason for Call:  Other prescription    Detailed comments: Pt would like to renew birth control for one more month as her appt is not until February and is running out of desogestrel-ethinyl estradiol (APRI) 0.15-30 MG-MCG tablet.     Phone Number Patient can be reached at: Cell number on file:    Telephone Information:   Mobile [phone]       Best Time: ANYTIME    Can we leave a detailed message on this number? YES    Call taken on 1/18/2021 at 5:07 PM by Beverly Melton

## 2021-01-19 RX ORDER — DESOGESTREL AND ETHINYL ESTRADIOL 0.15-0.03
1 KIT ORAL DAILY
Qty: 28 TABLET | Refills: 0 | Status: SHIPPED | OUTPATIENT
Start: 2021-01-19 | End: 2021-02-18

## 2021-01-21 ENCOUNTER — OFFICE VISIT (OUTPATIENT)
Dept: PEDIATRICS | Facility: CLINIC | Age: 41
End: 2021-01-21
Payer: COMMERCIAL

## 2021-01-21 VITALS
BODY MASS INDEX: 28.84 KG/M2 | DIASTOLIC BLOOD PRESSURE: 90 MMHG | HEIGHT: 60 IN | WEIGHT: 146.9 LBS | RESPIRATION RATE: 20 BRPM | TEMPERATURE: 97.9 F | HEART RATE: 100 BPM | OXYGEN SATURATION: 96 % | SYSTOLIC BLOOD PRESSURE: 146 MMHG

## 2021-01-21 DIAGNOSIS — Z13.1 SCREENING FOR DIABETES MELLITUS: ICD-10-CM

## 2021-01-21 DIAGNOSIS — Z11.59 ENCOUNTER FOR HEPATITIS C SCREENING TEST FOR LOW RISK PATIENT: ICD-10-CM

## 2021-01-21 DIAGNOSIS — R19.7 DIARRHEA, UNSPECIFIED TYPE: Primary | ICD-10-CM

## 2021-01-21 DIAGNOSIS — Z13.88 SCREENING FOR LEAD EXPOSURE: ICD-10-CM

## 2021-01-21 LAB — HBA1C MFR BLD: 5.1 % (ref 0–5.6)

## 2021-01-21 PROCEDURE — 80053 COMPREHEN METABOLIC PANEL: CPT | Performed by: INTERNAL MEDICINE

## 2021-01-21 PROCEDURE — 84202 ASSAY RBC PROTOPORPHYRIN: CPT | Mod: 90 | Performed by: INTERNAL MEDICINE

## 2021-01-21 PROCEDURE — 83655 ASSAY OF LEAD: CPT | Mod: 90 | Performed by: INTERNAL MEDICINE

## 2021-01-21 PROCEDURE — 99213 OFFICE O/P EST LOW 20 MIN: CPT | Mod: GE | Performed by: STUDENT IN AN ORGANIZED HEALTH CARE EDUCATION/TRAINING PROGRAM

## 2021-01-21 PROCEDURE — 83036 HEMOGLOBIN GLYCOSYLATED A1C: CPT | Performed by: INTERNAL MEDICINE

## 2021-01-21 PROCEDURE — 99000 SPECIMEN HANDLING OFFICE-LAB: CPT | Performed by: INTERNAL MEDICINE

## 2021-01-21 PROCEDURE — 36415 COLL VENOUS BLD VENIPUNCTURE: CPT | Performed by: INTERNAL MEDICINE

## 2021-01-21 ASSESSMENT — ENCOUNTER SYMPTOMS
HEADACHES: 0
CHILLS: 0
EYE PAIN: 0
FEVER: 0
HEMATOCHEZIA: 0
ABDOMINAL PAIN: 0
DIARRHEA: 0
MYALGIAS: 0
SHORTNESS OF BREATH: 0
SORE THROAT: 0
JOINT SWELLING: 0
ARTHRALGIAS: 0
WEAKNESS: 0
NAUSEA: 0
CONSTIPATION: 0
NERVOUS/ANXIOUS: 0
HEARTBURN: 0
COUGH: 0
DYSURIA: 0
HEMATURIA: 0
FREQUENCY: 0
DIZZINESS: 0
PARESTHESIAS: 0
PALPITATIONS: 0
BREAST MASS: 0

## 2021-01-21 ASSESSMENT — MIFFLIN-ST. JEOR: SCORE: 1267.83

## 2021-01-21 NOTE — PATIENT INSTRUCTIONS
We'll be in touch with your lab results.    Follow up with GI.    Please keep a food diary along with any symptoms that you have.

## 2021-01-21 NOTE — PROGRESS NOTES
{PROVIDER CHARTING PREFERENCE:409420}    Subjective     Lynda is a 40 year old who presents to clinic today for the following health issues {ACCOMPANIED BY STATEMENT (Optional):441774}    Healthy Habits:     Getting at least 3 servings of Calcium per day:  NO    Bi-annual eye exam:  Yes    Dental care twice a year:  NO    Sleep apnea or symptoms of sleep apnea:  None    Diet:  Regular (no restrictions)    Frequency of exercise:  None    Taking medications regularly:  Yes    Medication side effects:  None    PHQ-2 Total Score: 0    Additional concerns today:  Yes         {SUPERLIST (Optional):596373}  {additonal problems for provider to add (Optional):302594}    Review of Systems   Constitutional: Negative for chills and fever.   HENT: Negative for congestion, ear pain, hearing loss and sore throat.    Eyes: Negative for pain and visual disturbance.   Respiratory: Negative for cough and shortness of breath.    Cardiovascular: Negative for chest pain, palpitations and peripheral edema.   Gastrointestinal: Negative for abdominal pain, constipation, diarrhea, heartburn, hematochezia and nausea.   Breasts:  Negative for tenderness, breast mass and discharge.   Genitourinary: Negative for dysuria, frequency, genital sores, hematuria, pelvic pain, urgency, vaginal bleeding and vaginal discharge.   Musculoskeletal: Negative for arthralgias, joint swelling and myalgias.   Skin: Negative for rash.   Neurological: Negative for dizziness, weakness, headaches and paresthesias.   Psychiatric/Behavioral: Negative for mood changes. The patient is not nervous/anxious.             Objective    There were no vitals taken for this visit.  There is no height or weight on file to calculate BMI.  Physical Exam   {Exam List (Optional):466746}    {Diagnostic Test Results (Optional):481883}    {AMBULATORY ATTESTATION (Optional):037713}

## 2021-01-21 NOTE — PROGRESS NOTES
"  Assessment & Plan     Diarrhea, unspecified type  Pt reports episodic diarrhea with each episode 7-8 watery stools without blood lasting only 1 day followed by relief for 1-2 weeks and then repeated. No pain, mild nausea without emesis. Would be atypical for lactose intolerance which is what the pt is concerned for. No hx artificial sweeteners. Possible hepatomegaly on exam.  - Lead Industrial Exposure Panel Adults  - Comprehensive metabolic panel (BMP + Alb, Alk Phos, ALT, AST, Total. Bili, TP)  - GASTROENTEROLOGY ADULT REF CONSULT ONLY; Future    Screening for diabetes mellitus  Pt desires, has family hx extensively of DM2.  - Hemoglobin A1c    Screening for lead exposure  Has work exposures, pt requests to rule out lead toxicity.  - Lead Industrial Exposure Panel Adults    Encounter for hepatitis C screening test for low risk patient  Given hepatomegaly and new USPSTF guidelines will test while drawing labs.  - **Hepatitis C Screen Reflex to RNA FUTURE anytime; Future       BMI:   Estimated body mass index is 28.69 kg/m  as calculated from the following:    Height as of this encounter: 1.524 m (5').    Weight as of this encounter: 66.6 kg (146 lb 14.4 oz).   Weight management plan: Discussed healthy diet and exercise guidelines Patient was referred to their PCP to discuss a diet and exercise plan.    Return in about 4 weeks (around 2/18/2021) for Routine preventive, with me.    Meño Cobian MD  [facility] MAVIS Howell is a 40 year old who presents to clinic today for the following health issues     HPI   CC: \"I have GI stuff\"  -1/2020 thought she has food poisoning, was in the ED with dehydration in the setting of vomiting/diarrhea  - never got better from this acute episode with intermittent diarrhea weekly with 7-8 watery stools  - Hasn't done a food diary but wonders about dairy precipitation symptoms  - Summer was better while on BRAT diet  - Feels bloated and gassy all the " time   - Dairy makes diarrhea worse    Additionally pt notes fhx of DM2. Endorses polydipsia. Not sure if peeing more. No WL. Only WG noted.    Notes lead exposure at work. Wants lead testing.    She really wants lactose hydrogen breath testing.        Review of Systems   Constitutional, HEENT, cardiovascular, pulmonary, GI, , musculoskeletal, neuro, skin, endocrine and psych systems are negative, except as otherwise noted.      Objective    BP (!) 146/90 (BP Location: Right arm, Patient Position: Sitting, Cuff Size: Adult Regular)   Pulse 100   Temp 97.9  F (36.6  C) (Tympanic)   Resp 20   Ht 1.524 m (5')   Wt 66.6 kg (146 lb 14.4 oz)   SpO2 96%   BMI 28.69 kg/m    Body mass index is 28.69 kg/m .  Physical Exam   GENERAL: healthy, alert and no distress  EYES: Eyes grossly normal to inspection, PERRL and conjunctivae and sclerae normal  HENT: ear canals and TM's normal, nose and mouth without ulcers or lesions  NECK: no adenopathy, no asymmetry, masses, or scars and thyroid normal to palpation  RESP: lungs clear to auscultation - no rales, rhonchi or wheezes  CV: regular rate and rhythm, normal S1 S2, no S3 or S4, no murmur, click or rub, no peripheral edema and peripheral pulses strong  ABDOMEN: obese, soft, nontender, plausible hepatomegaly, no masses and bowel sounds normal  MS: no gross musculoskeletal defects noted, no edema  SKIN: no suspicious lesions or rashes  NEURO: Normal strength and tone, mentation intact and speech normal  PSYCH: mentation appears normal, affect normal/bright    No results found for this or any previous visit (from the past 24 hour(s)).

## 2021-01-22 LAB
ALBUMIN SERPL-MCNC: 3.5 G/DL (ref 3.4–5)
ALP SERPL-CCNC: 38 U/L (ref 40–150)
ALT SERPL W P-5'-P-CCNC: 37 U/L (ref 0–50)
ANION GAP SERPL CALCULATED.3IONS-SCNC: 6 MMOL/L (ref 3–14)
AST SERPL W P-5'-P-CCNC: 26 U/L (ref 0–45)
BILIRUB SERPL-MCNC: 0.4 MG/DL (ref 0.2–1.3)
BUN SERPL-MCNC: 10 MG/DL (ref 7–30)
CALCIUM SERPL-MCNC: 8.9 MG/DL (ref 8.5–10.1)
CHLORIDE SERPL-SCNC: 104 MMOL/L (ref 94–109)
CO2 SERPL-SCNC: 27 MMOL/L (ref 20–32)
CREAT SERPL-MCNC: 0.48 MG/DL (ref 0.52–1.04)
GFR SERPL CREATININE-BSD FRML MDRD: >90 ML/MIN/{1.73_M2}
GLUCOSE SERPL-MCNC: 85 MG/DL (ref 70–99)
POTASSIUM SERPL-SCNC: 3.7 MMOL/L (ref 3.4–5.3)
PROT SERPL-MCNC: 7.3 G/DL (ref 6.8–8.8)
SODIUM SERPL-SCNC: 137 MMOL/L (ref 133–144)

## 2021-01-26 LAB
LEAD BLD-MCNC: <2 UG/DL
ZPP RBC-MCNC: 32 UG/DL (ref 0–40)
ZPP RBC-SRTO: 54 UMOLZPP/MOLHEM (ref 0–69)

## 2021-01-28 ENCOUNTER — TELEPHONE (OUTPATIENT)
Dept: PEDIATRICS | Facility: CLINIC | Age: 41
End: 2021-01-28

## 2021-01-28 NOTE — TELEPHONE ENCOUNTER
The Pt called back and I did relay the below information. No questions or concerns at this time.     Karyn Vazquez, FAZAL   [facility] -- Triage Nurse

## 2021-01-28 NOTE — TELEPHONE ENCOUNTER
Copied from result notes    Meño Cobian MD [facility] Triage             Patient's lead levels, blood chemistry, and A1C were all normal. Please follow up with me for a wellness visit.      Call placed to pt   LM for pt to return call to the clinic    Thank you  Gabriele Montiel RN on 1/28/2021 at 10:50 AM

## 2021-02-18 ENCOUNTER — OFFICE VISIT (OUTPATIENT)
Dept: OBGYN | Facility: CLINIC | Age: 41
End: 2021-02-18
Payer: COMMERCIAL

## 2021-02-18 VITALS — DIASTOLIC BLOOD PRESSURE: 82 MMHG | BODY MASS INDEX: 28.32 KG/M2 | WEIGHT: 145 LBS | SYSTOLIC BLOOD PRESSURE: 120 MMHG

## 2021-02-18 DIAGNOSIS — F42.9 OBSESSIVE-COMPULSIVE DISORDER, UNSPECIFIED TYPE: ICD-10-CM

## 2021-02-18 DIAGNOSIS — N92.1 METRORRHAGIA: ICD-10-CM

## 2021-02-18 DIAGNOSIS — E78.5 HYPERLIPIDEMIA, UNSPECIFIED HYPERLIPIDEMIA TYPE: ICD-10-CM

## 2021-02-18 DIAGNOSIS — Z01.419 WOMEN'S ANNUAL ROUTINE GYNECOLOGICAL EXAMINATION: Primary | ICD-10-CM

## 2021-02-18 DIAGNOSIS — Z91.011 DAIRY ALLERGY: ICD-10-CM

## 2021-02-18 PROCEDURE — 99395 PREV VISIT EST AGE 18-39: CPT | Performed by: ADVANCED PRACTICE MIDWIFE

## 2021-02-18 RX ORDER — SERTRALINE HYDROCHLORIDE 100 MG/1
200 TABLET, FILM COATED ORAL DAILY
Qty: 180 TABLET | Refills: 3 | Status: SHIPPED | OUTPATIENT
Start: 2021-02-18 | End: 2022-04-26

## 2021-02-18 RX ORDER — DESOGESTREL AND ETHINYL ESTRADIOL 0.15-0.03
1 KIT ORAL DAILY
Qty: 84 TABLET | Refills: 4 | Status: SHIPPED | OUTPATIENT
Start: 2021-02-18 | End: 2022-02-08

## 2021-02-18 RX ORDER — CALCIUM CARBONATE 500(1250)
1 TABLET ORAL 2 TIMES DAILY
Qty: 60 TABLET | Refills: 3 | Status: SHIPPED | OUTPATIENT
Start: 2021-02-18

## 2021-02-18 NOTE — PROGRESS NOTES
Lynda is a 40 year old  female who presents for annual exam.     Besides routine health maintenance,  she would like to discuss intermittent breast lump in right breast.  HPI:  Lynda presents today for her annual exam. She has noted an intermittent lump under her right breast when she takes off her bra.   Menses are regular q 28-30 days and normal lasting 10 days.   Menses flow: normal and medium.    Patient's last menstrual period was 2021 (approximate)..   Using oral contraceptives for contraception.    She is not currently considering pregnancy.    REPRODUCTIVE/GYNECOLOGIC HISTORY:  Lynda is sexually active with male partner(s) and is currently in monogamous relationship x 17 years.   STI testing offered?  Declined  History of abnormal Pap smear:  No  SOCIAL HISTORY  Abuse: does not report having previously been physical or sexually abused.    Do you feel safe in your environment? YES       She  reports that she has never smoked. She has never used smokeless tobacco.      Last PHQ-9 score on record =   PHQ-9 SCORE 5/3/2018   PHQ-9 Total Score -   PHQ-9 Total Score 1     Last GAD7 score on record =   VIKKI-7 SCORE 2019   Total Score 0     Alcohol Score = 0    HEALTH MAINTENANCE:  Cholesterol: (  Cholesterol   Date Value Ref Range Status   2018 257 (H) <200 mg/dL Final     Comment:     Desirable:       <200 mg/dl   2016 213 (H) <200 mg/dL Final     Comment:     Desirable:       <200 mg/dl    History of abnormal lipids: Yes  Mammo: NA . History of abnormal Mammo: Not applicable.  Regular Self Breast Exams: Yes  TSH: (  TSH   Date Value Ref Range Status   2016 1.46 0.40 - 4.00 mU/L Final    )  Pap; (  Lab Results   Component Value Date    PAP NIL 08/15/2019    PAP NIL 2014    PAP NIL 2011    )  Immunizations up to date: yes  (Gardasil, Tdap, Flu)  Health maintenance updated:  yes    HEALTHY HABITS  Eating habits: follows a balanced nutrition diet  Calcium/Vitamin D  intake: source:  none Adequate?    Exercise: How often do you exercise? None  Have you had an eye exam in the last two years? YES     Do you routinely see the dentist once or twice yearly? NO      HISTORY:  OB History    Para Term  AB Living   1 1 0 1 0 1   SAB TAB Ectopic Multiple Live Births   0 0 0 0 1      # Outcome Date GA Lbr Miguel Angel/2nd Weight Sex Delivery Anes PTL Lv   1  12 32w3d  1.645 kg (3 lb 10 oz) M CS-LTranv  Y SOFY      Birth Comments: short umbilical cord      Name: Nicanor Lucio      Apgar1: 7  Apgar5: 9      Obstetric Comments   Delivered for recurrent bradycardia, decelerations     Past Medical History:   Diagnosis Date     Abnormal Pap smear     colposcopy,      Hx of previous reproductive problem     clomid     Irregular menses      Umbilical cord short in labor and delivery, delivered      Past Surgical History:   Procedure Laterality Date      SECTION  2012    Procedure:  SECTION;   Section.;  Surgeon: Juancarlos Lezama MD;  Location: [facility]     Family History   Problem Relation Age of Onset     Lipids Mother      Lipids Brother      C.A.D. Maternal Grandfather         quintuple bypass.  CHF     Blood Disease Paternal Grandfather         Non-Hodgkins Lymphoma     Alzheimer Disease Maternal Aunt         late 50s     Breast Cancer Maternal Aunt      Diabetes Brother         Type 2     Social History     Socioeconomic History     Marital status:      Spouse name: Naveed     Number of children: 1     Years of education: None     Highest education level: None   Occupational History     Occupation:      Employer: [employer]   Social Needs     Financial resource strain: None     Food insecurity     Worry: None     Inability: None     Transportation needs     Medical: None     Non-medical: None   Tobacco Use     Smoking status: Never Smoker     Smokeless tobacco: Never Used   Substance and Sexual Activity      Alcohol use: No     Drug use: No     Sexual activity: Yes     Partners: Male     Birth control/protection: Pill   Lifestyle     Physical activity     Days per week: None     Minutes per session: None     Stress: None   Relationships     Social connections     Talks on phone: None     Gets together: None     Attends Gnosticist service: None     Active member of club or organization: None     Attends meetings of clubs or organizations: None     Relationship status: None     Intimate partner violence     Fear of current or ex partner: None     Emotionally abused: None     Physically abused: None     Forced sexual activity: None   Other Topics Concern     Parent/sibling w/ CABG, MI or angioplasty before 65F 55M? Not Asked      Service No     Blood Transfusions No     Caffeine Concern Yes     Comment: Sensitive to caffeine     Occupational Exposure No     Hobby Hazards No     Sleep Concern No     Stress Concern No     Weight Concern No     Special Diet No     Back Care No     Exercise No     Bike Helmet Yes     Seat Belt Yes     Self-Exams No   Social History Narrative    Living arrangements - the patient lives with her family       Current Outpatient Medications:      desogestrel-ethinyl estradiol (APRI) 0.15-30 MG-MCG tablet, Take 1 tablet by mouth daily, Disp: 28 tablet, Rfl: 0     Fexofenadine HCl (ALLEGRA PO), , Disp: , Rfl:      sertraline (ZOLOFT) 100 MG tablet, Take 2 tablets (200 mg) by mouth daily, Disp: 180 tablet, Rfl: 3     Allergies   Allergen Reactions     Caffeine      Codeine Sulfate      Vicodin [Hydrocodone-Acetaminophen]        Past medical, surgical, social and family history were reviewed and updated in [facility].    ROS:   CONSTITUTIONAL: NEGATIVE for fever, chills, change in weight  INTEGUMENTARU/SKIN: NEGATIVE for worrisome rashes, moles or lesions  EYES: NEGATIVE for vision changes or irritation  ENT: NEGATIVE for ear, mouth and throat problems  RESP: NEGATIVE for significant cough or  SOB  BREAST: NEGATIVE for masses, tenderness or discharge  CV: NEGATIVE for chest pain, palpitations or peripheral edema  GI: NEGATIVE for nausea, abdominal pain, heartburn, or change in bowel habits  : NEGATIVE for unusual urinary or vaginal symptoms. Periods are regular.  MUSCULOSKELETAL: NEGATIVE for significant arthralgias or myalgia  NEURO: NEGATIVE for weakness, dizziness or paresthesias  ENDOCRINE: NEGATIVE for temperature intolerance, skin/hair changes  HEME/ALLERGY/IMMUNE: NEGATIVE for bleeding problems  PSYCHIATRIC: NEGATIVE for changes in mood or affect    PHYSICAL EXAM:  /82 (BP Location: Right arm, Cuff Size: Adult Regular)   Wt 65.8 kg (145 lb)   LMP 01/11/2021 (Approximate)   Breastfeeding No   BMI 28.32 kg/m     BMI: Body mass index is 28.32 kg/m .  Constitutional: healthy, alert and no distress  Neck: symmetrical, thyroid normal size, no masses present, no lymphadenopathy present.   Cardiovascular: RRR, no murmurs present  Respiratory: breathing unlabored, lungs CTA bilaterally  Breast:normal without masses, tenderness or nipple discharge and no palpable axillary masses or adenopathy  Gastrointestinal: abdomen soft, non-tender, bowel sounds present  PELVIC EXAM:  Vulva: No lesions, no adenopathy, BUS WNL  Vagina: Moist, pink, discharge normal  well rugated, no lesions  Cervix:smooth,  no  lesions  Uterus: Normal size, non-tender, mobile  Ovaries: No masses palpated  Rectal exam: deferred    ASSESSMENT/PLAN:      ICD-10-CM    1. Women's annual routine gynecological examination  Z01.419    2. Metrorrhagia  N92.1 desogestrel-ethinyl estradiol (APRI) 0.15-30 MG-MCG tablet   3. Obsessive-compulsive disorder, unspecified type  F42.9 sertraline (ZOLOFT) 100 MG tablet   4. Dairy allergy  Z91.011 calcium carbonate (OS-RAYMOND) 500 MG tablet   5. Hyperlipidemia, unspecified hyperlipidemia type  E78.5 Lipid panel reflex to direct LDL Fasting     1. Exam normal and appropriate for age. Next annual exam  due in one year.  COUNSELING:   Reviewed preventive health counseling, as reflected in patient instructions       Regular exercise       Healthy diet/nutrition   reports that she has never smoked. She has never used smokeless tobacco.  2. Refilled patient COCs per request.    The use of the oral contraceptive pill has been fully discussed with the patient. This includes the proper method to initiate  and continue the pill, the need for regular compliance to ensure adequate contraceptive effect, the physiology which makes the pill effective, the instructions for what to do in event of a missed pill, and warnings about anticipated minor side effects such as breakthrough spotting, nausea, breast tenderness, weight changes, acne, headaches, etc. She was informed of the irregular bleeding pattern that can occur when the pill is first started or a new form is changed over for the first 2-3 months.  She has been told of the more serious potential side effects such as MI, stroke, and deep vein thrombosis, all of which are very unlikely.  She has been asked to report any signs of such serious problems immediately.   She understands and wishes to take the medication as prescribed.    3. Refilled antidepressants per request. Patient has no wish to decrease dose at this time. She feels best on this dose.    4. Prescription for calcium as patient has stopped all dairy.    5. Recheck lipids, as patient has history of elevated lipids in the past. Will advise patient of results when available.    LOW Staley, BG

## 2021-02-18 NOTE — NURSING NOTE
Chief Complaint   Patient presents with     Physical       Initial /82 (BP Location: Right arm, Cuff Size: Adult Regular)   Wt 65.8 kg (145 lb)   LMP 2021 (Approximate)   Breastfeeding No   BMI 28.32 kg/m   Estimated body mass index is 28.32 kg/m  as calculated from the following:    Height as of 21: 1.524 m (5').    Weight as of this encounter: 65.8 kg (145 lb).  BP completed using cuff size: regular    Questioned patient about current smoking habits.  Pt. has never smoked.          The following HM Due: NONE

## 2021-03-05 DIAGNOSIS — E78.5 HYPERLIPIDEMIA, UNSPECIFIED HYPERLIPIDEMIA TYPE: ICD-10-CM

## 2021-03-05 DIAGNOSIS — Z11.59 ENCOUNTER FOR HEPATITIS C SCREENING TEST FOR LOW RISK PATIENT: ICD-10-CM

## 2021-03-05 PROCEDURE — 86803 HEPATITIS C AB TEST: CPT | Performed by: ADVANCED PRACTICE MIDWIFE

## 2021-03-05 PROCEDURE — 36415 COLL VENOUS BLD VENIPUNCTURE: CPT | Performed by: ADVANCED PRACTICE MIDWIFE

## 2021-03-05 PROCEDURE — 80061 LIPID PANEL: CPT | Performed by: ADVANCED PRACTICE MIDWIFE

## 2021-03-06 LAB
CHOLEST SERPL-MCNC: 252 MG/DL
HCV AB SERPL QL IA: NONREACTIVE
HDLC SERPL-MCNC: 85 MG/DL
LDLC SERPL CALC-MCNC: 147 MG/DL
NONHDLC SERPL-MCNC: 167 MG/DL
TRIGL SERPL-MCNC: 100 MG/DL

## 2021-10-02 ENCOUNTER — HEALTH MAINTENANCE LETTER (OUTPATIENT)
Age: 41
End: 2021-10-02

## 2022-02-08 DIAGNOSIS — N92.1 METRORRHAGIA: ICD-10-CM

## 2022-02-08 RX ORDER — DESOGESTREL AND ETHINYL ESTRADIOL 0.15-0.03
1 KIT ORAL DAILY
Qty: 84 TABLET | Refills: 1 | Status: SHIPPED | OUTPATIENT
Start: 2022-02-08 | End: 2022-05-24

## 2022-02-08 NOTE — TELEPHONE ENCOUNTER
Pt calling and is requesting a refill on her ocp.   She does have her annual exam scheduled for 5/24/22.    rx faxed in.     Amanda LOPEZ RN

## 2022-03-19 ENCOUNTER — HEALTH MAINTENANCE LETTER (OUTPATIENT)
Age: 42
End: 2022-03-19

## 2022-04-25 DIAGNOSIS — F42.9 OBSESSIVE-COMPULSIVE DISORDER, UNSPECIFIED TYPE: ICD-10-CM

## 2022-04-26 RX ORDER — SERTRALINE HYDROCHLORIDE 100 MG/1
200 TABLET, FILM COATED ORAL DAILY
Qty: 180 TABLET | Refills: 0 | Status: SHIPPED | OUTPATIENT
Start: 2022-04-26 | End: 2022-05-24

## 2022-04-26 NOTE — TELEPHONE ENCOUNTER
Medication is being filled for 1 time refill only due to:  future appt noted. Francisca Ferguson RN

## 2022-04-26 NOTE — TELEPHONE ENCOUNTER
Sertraline 100 mg  Routing refill request to provider for review/approval because:  Patient needs to be seen because it has been more than 1 year since last office visit.  Last written by Doreen Vidal CNM    Appointments in Next Year      May 24, 2022  1:00 PM  PHYSICAL with Verito Sanchez MD  [facility] ([facility] ) [phone]

## 2022-05-24 ENCOUNTER — OFFICE VISIT (OUTPATIENT)
Dept: OBGYN | Facility: CLINIC | Age: 42
End: 2022-05-24
Payer: COMMERCIAL

## 2022-05-24 VITALS — BODY MASS INDEX: 28.44 KG/M2 | DIASTOLIC BLOOD PRESSURE: 60 MMHG | SYSTOLIC BLOOD PRESSURE: 110 MMHG | WEIGHT: 145.6 LBS

## 2022-05-24 DIAGNOSIS — N92.1 METRORRHAGIA: ICD-10-CM

## 2022-05-24 DIAGNOSIS — F42.9 OBSESSIVE-COMPULSIVE DISORDER, UNSPECIFIED TYPE: ICD-10-CM

## 2022-05-24 DIAGNOSIS — Z01.419 WOMEN'S ANNUAL ROUTINE GYNECOLOGICAL EXAMINATION: Primary | ICD-10-CM

## 2022-05-24 PROCEDURE — 99396 PREV VISIT EST AGE 40-64: CPT | Performed by: OBSTETRICS & GYNECOLOGY

## 2022-05-24 RX ORDER — DESOGESTREL AND ETHINYL ESTRADIOL 0.15-0.03
1 KIT ORAL DAILY
Qty: 84 TABLET | Refills: 1 | Status: SHIPPED | OUTPATIENT
Start: 2022-05-24 | End: 2022-05-24

## 2022-05-24 RX ORDER — SERTRALINE HYDROCHLORIDE 100 MG/1
200 TABLET, FILM COATED ORAL DAILY
Qty: 180 TABLET | Refills: 3 | Status: SHIPPED | OUTPATIENT
Start: 2022-05-24 | End: 2023-07-27

## 2022-05-24 RX ORDER — SERTRALINE HYDROCHLORIDE 100 MG/1
200 TABLET, FILM COATED ORAL DAILY
Qty: 180 TABLET | Refills: 0 | Status: SHIPPED | OUTPATIENT
Start: 2022-05-24 | End: 2022-05-24

## 2022-05-24 RX ORDER — DESOGESTREL AND ETHINYL ESTRADIOL 0.15-0.03
1 KIT ORAL DAILY
Qty: 84 TABLET | Refills: 3 | Status: SHIPPED | OUTPATIENT
Start: 2022-05-24 | End: 2023-03-22

## 2022-05-24 NOTE — NURSING NOTE
Chief Complaint   Patient presents with     Gyn Exam     Needs refill on OCP       Initial /60   Wt 66 kg (145 lb 9.6 oz)   LMP 2022 (Approximate)   BMI 28.44 kg/m   Estimated body mass index is 28.44 kg/m  as calculated from the following:    Height as of 21: 1.524 m (5').    Weight as of this encounter: 66 kg (145 lb 9.6 oz).  BP completed using cuff size: regular    Questioned patient about current smoking habits.  Pt. has never smoked.          The following HM Due: NONE

## 2022-05-24 NOTE — PROGRESS NOTES
SUBJECTIVE:                                                      Lynda is a 42 year old  female who presents for annual exam.     Patient's last menstrual period was 2022 (approximate).. Menses are regular q 28-30 days and normal.  Using oral contraceptives for contraception.  She is not currently considering pregnancy.  Besides routine health maintenance, she has no other health concerns today .  GYNECOLOGIC HISTORY:    Lynda is sexually active with 1 male partner(s) and is currently in a monogamous relationship.      History sexually transmitted infections:No STD history    History of abnormal Pap smear: NO - age 30-65 PAP every 5 years with negative HPV co-testing recommended  Family history of breast CA: Yes (Please explain): maternal aunt  Family history of uterine/ovarian CA: No    Family history of colon CA: No      HISTORY:  OB History    Para Term  AB Living   1 1 0 1 0 1   SAB IAB Ectopic Multiple Live Births   0 0 0 0 1      # Outcome Date GA Lbr Miguel Angel/2nd Weight Sex Delivery Anes PTL Lv   1  12 32w3d  1.645 kg (3 lb 10 oz) M CS-LTranv  Y SOFY      Birth Comments: short umbilical cord      Name: Nicanor Lucio      Apgar1: 7  Apgar5: 9      Obstetric Comments   Delivered for recurrent bradycardia, decelerations     Past Medical History:   Diagnosis Date     Abnormal Pap smear     colposcopy,      Hx of previous reproductive problem     clomid     Irregular menses      Umbilical cord short in labor and delivery, delivered      Past Surgical History:   Procedure Laterality Date      SECTION  2012    Procedure:  SECTION;   Section.;  Surgeon: Juancarlos Lezama MD;  Location: [facility]     Family History   Problem Relation Age of Onset     Lipids Mother      Lipids Brother      C.A.D. Maternal Grandfather         quintuple bypass.  CHF     Blood Disease Paternal Grandfather         Non-Hodgkins Lymphoma     Alzheimer Disease Maternal  Aunt         late 50s     Breast Cancer Maternal Aunt      Diabetes Brother         Type 2     Social History     Socioeconomic History     Marital status:      Spouse name: Naveed     Number of children: 1   Occupational History     Occupation:      Employer: [employer]   Tobacco Use     Smoking status: Never Smoker     Smokeless tobacco: Never Used   Substance and Sexual Activity     Alcohol use: No     Drug use: No     Sexual activity: Yes     Partners: Male     Birth control/protection: Pill   Other Topics Concern      Service No     Blood Transfusions No     Caffeine Concern Yes     Comment: Sensitive to caffeine     Occupational Exposure No     Hobby Hazards No     Sleep Concern No     Stress Concern No     Weight Concern No     Special Diet No     Back Care No     Exercise No     Bike Helmet Yes     Seat Belt Yes     Self-Exams No   Social History Narrative    Living arrangements - the patient lives with her family       Current Outpatient Medications:      calcium carbonate (OS-RAYMOND) 500 MG tablet, Take 1 tablet (500 mg) by mouth 2 times daily, Disp: 60 tablet, Rfl: 3     desogestrel-ethinyl estradiol (APRI) 0.15-30 MG-MCG tablet, Take 1 tablet by mouth daily, Disp: 84 tablet, Rfl: 1     Fexofenadine HCl (ALLEGRA PO), , Disp: , Rfl:      sertraline (ZOLOFT) 100 MG tablet, Take 2 tablets (200 mg) by mouth daily, Disp: 180 tablet, Rfl: 0     Allergies   Allergen Reactions     Caffeine      Codeine Sulfate      Vicodin [Hydrocodone-Acetaminophen]        Past medical, surgical, social and family history were reviewed and updated in [facility].    ROS:   Negative other than as noted above.      OBJECTIVE:                                                      EXAM:  /60   Wt 66 kg (145 lb 9.6 oz)   LMP 04/19/2022 (Approximate)   BMI 28.44 kg/m     BMI: Body mass index is 28.44 kg/m .  General: Alert and oriented, no distress.  Psychiatric: Mood and affect within normal limits.  Skin:  Warm and dry, no lesions, rashes or discolorations.  Neck: Neck supple. Thyroid palpbably normal in size and without nodularity.  Cardiovascular: Regular rate and rhythm, no murmurs, rubs or gallops.   Lungs:  Clear to auscultation bilaterally, breathing is unlabored.  Breasts:  Symmetric, no skin changes.  No dominant masses bilaterally.   Lymph:  No cervical, supraclavicular, infraclavicular, axillary or inguinal lymphadenopathy palpable.   Abdomen: Soft, nontender, no hepatosplenomegaly, no rebound or guarding, no masses, no hernias.   Vulva:  No external lesions, normal female hair distribution, no inguinal adenopathy.    Urethra:  Midline, non-tender, well supported, no discharge  Vagina:  Well-estrogenized, no abnormal discharge, no lesions  Cervix: no lesions, no discharge  Uterus:  anteverted, smooth contour, without enlargement, mobile, and without tenderness  Ovaries:  No masses appreciated, non-tender, mobile  Rectal Exam: deferred  Musculoskeletal: extremities normal      COUNSELING:   Reviewed preventive health counseling, as reflected in patient instructions       Regular exercise       Healthy diet/nutrition       Contraception   reports that she has never smoked. She has never used smokeless tobacco.        ASSESSMENT/PLAN:                                                      42 year old female with satisfactory annual exam  (Z01.419) Women's annual routine gynecological examination  (primary encounter diagnosis)  Comment:   Plan: pap and HPV every 5 years. Start mammography this year. Colon cancer screening at age 47.    (F42.9) Obsessive-compulsive disorder, unspecified type  Comment:   Plan: sertraline (ZOLOFT) 100 MG tablet,         DISCONTINUED: sertraline (ZOLOFT) 100 MG tablet            (N92.1) Metrorrhagia  Comment:   Plan: desogestrel-ethinyl estradiol (APRI) 0.15-30         MG-MCG tablet, DISCONTINUED:         desogestrel-ethinyl estradiol (APRI) 0.15-30         MG-MCG tablet               Verito Sanchez MD

## 2022-09-03 ENCOUNTER — HEALTH MAINTENANCE LETTER (OUTPATIENT)
Age: 42
End: 2022-09-03

## 2022-12-02 ENCOUNTER — HOSPITAL ENCOUNTER (OUTPATIENT)
Dept: MAMMOGRAPHY | Facility: CLINIC | Age: 42
Discharge: HOME | End: 2022-12-02
Attending: OBSTETRICS & GYNECOLOGY | Admitting: OBSTETRICS & GYNECOLOGY
Payer: COMMERCIAL

## 2022-12-02 DIAGNOSIS — Z01.419 WOMEN'S ANNUAL ROUTINE GYNECOLOGICAL EXAMINATION: ICD-10-CM

## 2022-12-02 PROCEDURE — 77067 SCR MAMMO BI INCL CAD: CPT

## 2023-05-31 DIAGNOSIS — N92.1 METRORRHAGIA: ICD-10-CM

## 2023-05-31 RX ORDER — DESOGESTREL AND ETHINYL ESTRADIOL 0.15-0.03
1 KIT ORAL DAILY
Qty: 84 TABLET | Refills: 0 | Status: SHIPPED | OUTPATIENT
Start: 2023-05-31 | End: 2023-07-27

## 2023-05-31 NOTE — TELEPHONE ENCOUNTER
Medication is being filled for 1 time refill only due to:  Patient needs to be seen because it has been more than one year since last visit. Pt has an appt to be seen with Dr Sanchez on 7/27/23.    Requested Prescriptions   Signed Prescriptions Disp Refills    desogestrel-ethinyl estradiol (APRI) 0.15-30 MG-MCG tablet 84 tablet 0     Sig: Take 1 tablet by mouth daily Take continuously skipping placebo pills       There is no refill protocol information for this order          FAZAL Gatica

## 2023-07-22 ENCOUNTER — HEALTH MAINTENANCE LETTER (OUTPATIENT)
Age: 43
End: 2023-07-22

## 2023-07-27 ENCOUNTER — OFFICE VISIT (OUTPATIENT)
Dept: OBGYN | Facility: CLINIC | Age: 43
End: 2023-07-27
Payer: COMMERCIAL

## 2023-07-27 ENCOUNTER — LAB (OUTPATIENT)
Dept: LAB | Facility: CLINIC | Age: 43
End: 2023-07-27
Payer: COMMERCIAL

## 2023-07-27 VITALS — DIASTOLIC BLOOD PRESSURE: 90 MMHG | SYSTOLIC BLOOD PRESSURE: 128 MMHG | WEIGHT: 150 LBS | BODY MASS INDEX: 29.29 KG/M2

## 2023-07-27 DIAGNOSIS — N92.1 BREAKTHROUGH BLEEDING ON BIRTH CONTROL PILLS: ICD-10-CM

## 2023-07-27 DIAGNOSIS — Z23 NEED FOR TDAP VACCINATION: Primary | ICD-10-CM

## 2023-07-27 DIAGNOSIS — F42.9 OBSESSIVE-COMPULSIVE DISORDER, UNSPECIFIED TYPE: ICD-10-CM

## 2023-07-27 DIAGNOSIS — Z01.419 WOMEN'S ANNUAL ROUTINE GYNECOLOGICAL EXAMINATION: ICD-10-CM

## 2023-07-27 DIAGNOSIS — N92.1 METRORRHAGIA: ICD-10-CM

## 2023-07-27 LAB — TSH SERPL DL<=0.005 MIU/L-ACNC: 2.29 UIU/ML (ref 0.3–4.2)

## 2023-07-27 PROCEDURE — 84443 ASSAY THYROID STIM HORMONE: CPT

## 2023-07-27 PROCEDURE — 90471 IMMUNIZATION ADMIN: CPT | Performed by: OBSTETRICS & GYNECOLOGY

## 2023-07-27 PROCEDURE — 36415 COLL VENOUS BLD VENIPUNCTURE: CPT

## 2023-07-27 PROCEDURE — 99396 PREV VISIT EST AGE 40-64: CPT | Performed by: OBSTETRICS & GYNECOLOGY

## 2023-07-27 PROCEDURE — 90715 TDAP VACCINE 7 YRS/> IM: CPT | Performed by: OBSTETRICS & GYNECOLOGY

## 2023-07-27 PROCEDURE — 99213 OFFICE O/P EST LOW 20 MIN: CPT | Mod: 25 | Performed by: OBSTETRICS & GYNECOLOGY

## 2023-07-27 RX ORDER — SERTRALINE HYDROCHLORIDE 100 MG/1
200 TABLET, FILM COATED ORAL DAILY
Qty: 180 TABLET | Refills: 3 | Status: SHIPPED | OUTPATIENT
Start: 2023-07-27 | End: 2024-07-16

## 2023-07-27 RX ORDER — DESOGESTREL AND ETHINYL ESTRADIOL 0.15-0.03
1 KIT ORAL DAILY
Qty: 112 TABLET | Refills: 3 | Status: SHIPPED | OUTPATIENT
Start: 2023-07-27 | End: 2024-07-16

## 2023-07-27 ASSESSMENT — ANXIETY QUESTIONNAIRES
2. NOT BEING ABLE TO STOP OR CONTROL WORRYING: NOT AT ALL
1. FEELING NERVOUS, ANXIOUS, OR ON EDGE: NOT AT ALL
GAD7 TOTAL SCORE: 0
7. FEELING AFRAID AS IF SOMETHING AWFUL MIGHT HAPPEN: NOT AT ALL
5. BEING SO RESTLESS THAT IT IS HARD TO SIT STILL: NOT AT ALL
GAD7 TOTAL SCORE: 0
6. BECOMING EASILY ANNOYED OR IRRITABLE: NOT AT ALL
3. WORRYING TOO MUCH ABOUT DIFFERENT THINGS: NOT AT ALL

## 2023-07-27 ASSESSMENT — PATIENT HEALTH QUESTIONNAIRE - PHQ9: 5. POOR APPETITE OR OVEREATING: NOT AT ALL

## 2023-07-27 NOTE — PROGRESS NOTES
SUBJECTIVE:                                                      Lynda is a 43 year old  female who presents for annual exam.     No LMP recorded..  Menses are regular q 28-30 days and normal.  Using oral contraceptives for contraception.  She is not currently considering pregnancy.  Besides routine health maintenance, she has no other health concerns today .  GYNECOLOGIC HISTORY:     Lynda is sexually active with 1 male partner(s) and is currently in a monogamous relationship.       History sexually transmitted infections:No STD history     History of abnormal Pap smear: NO - age 30-65 PAP every 5 years with negative HPV co-testing recommended  Family history of breast CA: Yes (Please explain): maternal aunt  Family history of uterine/ovarian CA: No     Family history of colon CA: No    HISTORY:  OB History    Para Term  AB Living   1 1 0 1 0 1   SAB IAB Ectopic Multiple Live Births   0 0 0 0 1      # Outcome Date GA Lbr Miguel Angel/2nd Weight Sex Delivery Anes PTL Lv   1  12 32w3d  1.645 kg (3 lb 10 oz) M CS-LTranv  Y SOFY      Birth Comments: short umbilical cord      Name: Nicanor Lucio      Apgar1: 7  Apgar5: 9      Obstetric Comments   Delivered for recurrent bradycardia, decelerations     Past Medical History:   Diagnosis Date    Abnormal Pap smear     colposcopy,     Hx of previous reproductive problem     clomid    Irregular menses     Umbilical cord short in labor and delivery, delivered      Past Surgical History:   Procedure Laterality Date     SECTION  2012    Procedure:  SECTION;   Section.;  Surgeon: Juancarlos Lezama MD;  Location:  L+D     Family History   Problem Relation Age of Onset    Lipids Mother     Lipids Brother     Diabetes Brother         Type 2    C.A.D. Maternal Grandfather         quintuple bypass.  CHF    Blood Disease Paternal Grandfather         Non-Hodgkins Lymphoma    Alzheimer Disease Maternal Aunt          late 50s    Breast Cancer Maternal Aunt         postmenopausal     Social History     Socioeconomic History    Marital status:      Spouse name: Naveed    Number of children: 1    Years of education: None    Highest education level: None   Occupational History    Occupation:      Employer: [employer]   Tobacco Use    Smoking status: Never    Smokeless tobacco: Never   Substance and Sexual Activity    Alcohol use: No    Drug use: No    Sexual activity: Yes     Partners: Male     Birth control/protection: Pill   Other Topics Concern     Service No    Blood Transfusions No    Caffeine Concern Yes     Comment: Sensitive to caffeine    Occupational Exposure No    Hobby Hazards No    Sleep Concern No    Stress Concern No    Weight Concern No    Special Diet No    Back Care No    Exercise No    Bike Helmet Yes    Seat Belt Yes    Self-Exams No   Social History Narrative    Living arrangements - the patient lives with her family       Current Outpatient Medications:     desogestrel-ethinyl estradiol (APRI) 0.15-30 MG-MCG tablet, Take 1 tablet by mouth daily Take continuously skipping placebo pills, Disp: 84 tablet, Rfl: 0    Fexofenadine HCl (ALLEGRA PO), , Disp: , Rfl:     sertraline (ZOLOFT) 100 MG tablet, Take 2 tablets (200 mg) by mouth daily, Disp: 180 tablet, Rfl: 3    calcium carbonate (OS-RAYMOND) 500 MG tablet, Take 1 tablet (500 mg) by mouth 2 times daily, Disp: 60 tablet, Rfl: 3     Allergies   Allergen Reactions    Caffeine     Codeine Sulfate     Vicodin [Hydrocodone-Acetaminophen]        Past medical, surgical, social and family history were reviewed and updated in [facility].    ROS:   Negative other than as noted above.      OBJECTIVE:                                                      EXAM:  BP (!) 128/90   Wt 68 kg (150 lb)   BMI 29.29 kg/m     BMI: Body mass index is 29.29 kg/m .  General: Alert and oriented, no distress.  Psychiatric: Mood and affect within normal limits.  Skin:  Warm and dry, no lesions, rashes or discolorations.  Neck: Neck supple. Thyroid palpbably normal in size and without nodularity.  Breasts:  Symmetric, no skin changes.  No dominant masses bilaterally.   Lymph:  No cervical, supraclavicular, infraclavicular, axillary or inguinal lymphadenopathy palpable.   Abdomen: Soft, nontender, no hepatosplenomegaly, no rebound or guarding, no masses, no hernias.   Vulva:  No external lesions, normal female hair distribution, no inguinal adenopathy.    Urethra:  Midline, non-tender, well supported, no discharge  Vagina:  Well-estrogenized, no abnormal discharge, no lesions  Cervix: no lesions, no discharge  Uterus:  anteverted, smooth contour, without enlargement, mobile, and without tenderness  Ovaries:  No masses appreciated, non-tender, mobile  Rectal Exam: deferred  Musculoskeletal: extremities normal      COUNSELING:   Reviewed preventive health counseling, as reflected in patient instructions   reports that she has never smoked. She has never used smokeless tobacco.        ASSESSMENT/PLAN:                                                      43 year old female with satisfactory annual exam  (Z23) Need for Tdap vaccination  (primary encounter diagnosis)  Comment:   Plan: TDAP 10-64Y (ADACEL,BOOSTRIX)            (Z01.419) Women's annual routine gynecological examination  Comment:   Plan: follow up yearly    (N92.1) Breakthrough bleeding on birth control pill  Comment:   Plan: US Pelvic Transabdominal and Transvaginal, TSH         with free T4 reflex        Will check labs and US, discussed possible causes. If no change, plan to switch oral contraceptive pills. Discussed non extended regimens and als    (F42.9) Obsessive-compulsive disorder, unspecified type  Comment:   Plan: sertraline (ZOLOFT) 100 MG tablet        Stable on meds. Refilled for one year.    (N92.1) Metrorrhagia  Comment:   Plan: desogestrel-ethinyl estradiol (APRI) 0.15-30         MG-MCG tablet        Renewed   for one year. May switch if she keeps having breakthrough bleeding.    Verito Sanchez MD

## 2023-07-27 NOTE — NURSING NOTE
Chief Complaint   Patient presents with    Physical     Sertraline and ocp refill       Initial BP (!) 128/90   Wt 68 kg (150 lb)   BMI 29.29 kg/m   Estimated body mass index is 29.29 kg/m  as calculated from the following:    Height as of 21: 1.524 m (5').    Weight as of this encounter: 68 kg (150 lb).  BP completed using cuff size: regular    Questioned patient about current smoking habits.  Pt. has never smoked.          The following HM Due: NONE

## 2023-07-31 ENCOUNTER — ANCILLARY PROCEDURE (OUTPATIENT)
Dept: ULTRASOUND IMAGING | Facility: CLINIC | Age: 43
End: 2023-07-31
Attending: OBSTETRICS & GYNECOLOGY
Payer: COMMERCIAL

## 2023-07-31 DIAGNOSIS — N92.1 BREAKTHROUGH BLEEDING ON BIRTH CONTROL PILLS: ICD-10-CM

## 2023-07-31 PROCEDURE — 76856 US EXAM PELVIC COMPLETE: CPT | Performed by: OBSTETRICS & GYNECOLOGY

## 2023-07-31 PROCEDURE — 76830 TRANSVAGINAL US NON-OB: CPT | Performed by: OBSTETRICS & GYNECOLOGY

## 2023-11-03 ENCOUNTER — VIRTUAL VISIT (OUTPATIENT)
Dept: OBGYN | Facility: CLINIC | Age: 43
End: 2023-11-03
Payer: COMMERCIAL

## 2023-11-03 DIAGNOSIS — N92.1 BREAKTHROUGH BLEEDING ON BIRTH CONTROL PILLS: Primary | ICD-10-CM

## 2023-11-03 DIAGNOSIS — N92.1 METRORRHAGIA: ICD-10-CM

## 2023-11-03 PROCEDURE — 99212 OFFICE O/P EST SF 10 MIN: CPT | Mod: 93 | Performed by: OBSTETRICS & GYNECOLOGY

## 2023-11-03 NOTE — PROGRESS NOTES
Lynda Cohen is a 43 year old female who is being evaluated via a billable telephone visit.      What phone number would you like to be contacted at? [phone]  How would you like to obtain your AVS? Ayse  Patient in [place] and provider at [facility] for visit.    Lynda Cohen is a 43 year old female who presents for virtual visit today for the following health issue(s):  Patient presents with:  Results: Review US results and next steps    Vaginal bleeding has been intermittent off and on for a few months.  See last note. US 7/2023 suggested likely endocervical polyp.  However, this could also represent collection of fluid, which we discussed.  Since she is discussion pelvic ultrasound, which she.  If polyp is persistent, plan will be hysteroscopy and polypectomy.  Risk, benefits, and alternatives discussed today.    She has continued to have some irregular bleeding despite the use of oral contraceptives.    Ultrasound ordered.  Information given to schedule.    Total time 6:06.    Verito Sanchez MD  [facility]

## 2023-11-07 ENCOUNTER — ANCILLARY PROCEDURE (OUTPATIENT)
Dept: ULTRASOUND IMAGING | Facility: CLINIC | Age: 43
End: 2023-11-07
Attending: OBSTETRICS & GYNECOLOGY
Payer: COMMERCIAL

## 2023-11-07 DIAGNOSIS — N92.1 METRORRHAGIA: ICD-10-CM

## 2023-11-07 DIAGNOSIS — N92.1 BREAKTHROUGH BLEEDING ON BIRTH CONTROL PILLS: ICD-10-CM

## 2023-11-07 PROCEDURE — 76856 US EXAM PELVIC COMPLETE: CPT | Performed by: OBSTETRICS & GYNECOLOGY

## 2023-11-07 PROCEDURE — 76830 TRANSVAGINAL US NON-OB: CPT | Performed by: OBSTETRICS & GYNECOLOGY

## 2023-11-20 ENCOUNTER — TELEPHONE (OUTPATIENT)
Dept: OBGYN | Facility: CLINIC | Age: 43
End: 2023-11-20
Payer: COMMERCIAL

## 2023-11-20 NOTE — TELEPHONE ENCOUNTER
Surgeon: KARINA SANCHEZ   Assist:  No   Location: [facility]   Date/time preference:  per patient     Surgery:  hysteroscopy, dilation and curettage, possible polypectomy with Myosure   Length of Surgery:  30 min   Diagnosis:  polyp vs fibroid   Anesthesia type:  GENERAL     Special instructions / equipment:  Myosure   Am admit or same day: SAME DAY   Bowel prep: No   Pre op: PCP   Office visit with surgeon prior to surgery: No   Schedule postop visit: 1-2 weeks virtual or in person per her preference     Thanks,   Karina Sanchez MD

## 2023-12-07 NOTE — TELEPHONE ENCOUNTER
Type of surgery: hysteroscopy d&c, possible polypectomy with myosure  Location of surgery: [facility]  Date and time of surgery: 1/17/24 @ 7:30 am  Surgeon: Dr. Sanchez  Pre-Op Appt Date: Patient advised to schedule.  Post-Op Appt Date: 1/25/24   Packet sent out: Yes  Pre-cert/Authorization completed:  No  Date: 12/7/23

## 2023-12-19 NOTE — TELEPHONE ENCOUNTER
Patient called to cancel surgery.  She stated she is feeling better and will follow up with Dr. Sanchez.    Surgery canceled at [facility].

## 2024-06-27 ENCOUNTER — PATIENT OUTREACH (OUTPATIENT)
Dept: CARE COORDINATION | Facility: CLINIC | Age: 44
End: 2024-06-27
Payer: COMMERCIAL

## 2024-07-11 ENCOUNTER — PATIENT OUTREACH (OUTPATIENT)
Dept: CARE COORDINATION | Facility: CLINIC | Age: 44
End: 2024-07-11
Payer: COMMERCIAL

## 2024-07-15 DIAGNOSIS — F42.9 OBSESSIVE-COMPULSIVE DISORDER, UNSPECIFIED TYPE: ICD-10-CM

## 2024-07-15 DIAGNOSIS — N92.1 METRORRHAGIA: ICD-10-CM

## 2024-07-16 RX ORDER — DESOGESTREL AND ETHINYL ESTRADIOL 0.15-0.03
KIT ORAL
Qty: 112 TABLET | Refills: 0 | Status: SHIPPED | OUTPATIENT
Start: 2024-07-16 | End: 2024-09-20

## 2024-07-16 RX ORDER — SERTRALINE HYDROCHLORIDE 100 MG/1
200 TABLET, FILM COATED ORAL DAILY
Qty: 180 TABLET | Refills: 0 | Status: SHIPPED | OUTPATIENT
Start: 2024-07-16 | End: 2024-09-20

## 2024-07-16 NOTE — TELEPHONE ENCOUNTER
Requested Prescriptions   Pending Prescriptions Disp Refills    sertraline (ZOLOFT) 100 MG tablet [Pharmacy Med Name: SERTRALINE HCL 100MG TABS] 180 tablet 3     Sig: TAKE 2 TABLETS (200 MG) BY MOUTH DAILY       SSRIs Protocol Passed - 7/15/2024  8:13 PM        Passed - Medication is active on med list        Passed - Recent (12 mo) or future (90 days) visit within the authorizing provider's specialty     The patient must have completed an in-person or virtual visit within the past 12 months or has a future visit scheduled within the next 90 days with the authorizing provider s specialty.  Urgent care and e-visits do not quality as an office visit for this protocol.          Passed - Medication indicated for associated diagnosis     Medication is associated with one or more of the following diagnoses:             Anxiety            Bipolar            Depression            Obsessive-compulsive disorder            Panic disorder            Postmenopausal flushing            Premenstrual dysphoric disorder            Social phobia             Passed - Patient is age 18 or older        Passed - No active pregnancy on record        Passed - No positive pregnancy test in last 12 months          JULEBER 0.15-30 MG-MCG tablet [Pharmacy Med Name: JULEBER 0.15-30MG-MCG TABS] 112 tablet 3     Sig: TAKE ONE ACTIVE TABLET BY MOUTH ONCE DAILY. TAKE CONTINUOUSLY, SKIPPING PLACEBO PILLS       Contraceptives Protocol Failed - 7/15/2024  8:13 PM        Failed - Medication indicated for associated diagnosis     Medication is associated with one or more of the following diagnoses:  Contraception  Acne  Dysmenorrhea  Menorrhagia  Amenorrhea  PCOS  Premenstrual Dysphoric Disorder          Passed - Patient is not a current smoker if age is 35 or older        Passed - Recent (12 mo) or future (30 days) visit within the authorizing provider's specialty     The patient must have completed an in-person or virtual visit within the past 12  months or has a future visit scheduled within the next 90 days with the authorizing provider s specialty.  Urgent care and e-visits do not quality as an office visit for this protocol.          Passed - Medication is active on med list        Passed - No active pregnancy on record        Passed - No positive pregnancy test in past 12 months           Due for visit in Nov, filled.

## 2024-09-14 ENCOUNTER — HEALTH MAINTENANCE LETTER (OUTPATIENT)
Age: 44
End: 2024-09-14

## 2024-09-20 ENCOUNTER — HOSPITAL ENCOUNTER (OUTPATIENT)
Dept: MAMMOGRAPHY | Facility: CLINIC | Age: 44
Discharge: HOME | End: 2024-09-20
Attending: OBSTETRICS & GYNECOLOGY | Admitting: OBSTETRICS & GYNECOLOGY
Payer: COMMERCIAL

## 2024-09-20 ENCOUNTER — OFFICE VISIT (OUTPATIENT)
Dept: OBGYN | Facility: CLINIC | Age: 44
End: 2024-09-20
Payer: COMMERCIAL

## 2024-09-20 VITALS — WEIGHT: 153 LBS | DIASTOLIC BLOOD PRESSURE: 74 MMHG | SYSTOLIC BLOOD PRESSURE: 112 MMHG | BODY MASS INDEX: 29.88 KG/M2

## 2024-09-20 DIAGNOSIS — N92.1 METRORRHAGIA: ICD-10-CM

## 2024-09-20 DIAGNOSIS — Z12.4 PAP SMEAR FOR CERVICAL CANCER SCREENING: Primary | ICD-10-CM

## 2024-09-20 DIAGNOSIS — Z12.31 VISIT FOR SCREENING MAMMOGRAM: ICD-10-CM

## 2024-09-20 DIAGNOSIS — F42.9 OBSESSIVE-COMPULSIVE DISORDER, UNSPECIFIED TYPE: ICD-10-CM

## 2024-09-20 PROCEDURE — 99396 PREV VISIT EST AGE 40-64: CPT | Performed by: OBSTETRICS & GYNECOLOGY

## 2024-09-20 PROCEDURE — G0145 SCR C/V CYTO,THINLAYER,RESCR: HCPCS | Performed by: OBSTETRICS & GYNECOLOGY

## 2024-09-20 PROCEDURE — 87624 HPV HI-RISK TYP POOLED RSLT: CPT | Performed by: OBSTETRICS & GYNECOLOGY

## 2024-09-20 PROCEDURE — 99459 PELVIC EXAMINATION: CPT | Performed by: OBSTETRICS & GYNECOLOGY

## 2024-09-20 PROCEDURE — 77063 BREAST TOMOSYNTHESIS BI: CPT

## 2024-09-20 PROCEDURE — 99213 OFFICE O/P EST LOW 20 MIN: CPT | Mod: 25 | Performed by: OBSTETRICS & GYNECOLOGY

## 2024-09-20 RX ORDER — DESOGESTREL AND ETHINYL ESTRADIOL 0.15-0.03
1 KIT ORAL DAILY
Qty: 112 TABLET | Refills: 3 | Status: SHIPPED | OUTPATIENT
Start: 2024-09-20

## 2024-09-20 RX ORDER — AMLODIPINE BESYLATE 5 MG/1
5 TABLET ORAL DAILY
COMMUNITY
Start: 2024-03-27 | End: 2024-09-20

## 2024-09-20 RX ORDER — CARVEDILOL 12.5 MG/1
12.5 TABLET ORAL
COMMUNITY
Start: 2024-04-10 | End: 2025-04-10

## 2024-09-20 RX ORDER — SERTRALINE HYDROCHLORIDE 100 MG/1
200 TABLET, FILM COATED ORAL DAILY
Qty: 180 TABLET | Refills: 3 | Status: SHIPPED | OUTPATIENT
Start: 2024-09-20

## 2024-09-20 NOTE — PROGRESS NOTES
SUBJECTIVE:                                                      Lynda is a 44 year old  female who presents for annual exam.     No LMP recorded. (Menstrual status: Birth Control).. Menses are absent on continuous OCPs, which she is doing very well with and would like to continue.  She does have a history of hypertension, but it is well-controlled.  We discussed that this is 1 of those situations where we have to weigh the risks and benefits of being on the OCPs for prevention of pregnancy and control of abnormal bleeding versus the potential impact on her blood pressures.  Since her blood pressures are currently well-controlled she is opting to continue this, and given her history I think this is appropriate at this time.  Thus this medication was renewed for a year today.  Besides routine health maintenance, she continues on Zoloft for OCD and mood changes.  This has been quite stable and she needs a refill for this as well.  GYNECOLOGIC HISTORY:    Lynda is sexually active with male partner(s).      History sexually transmitted infections:No STD history    Pap history reviewed.    Family history of breast CA: Yes (Please explain): Maternal aunt, postmenopausal  Family history of uterine/ovarian CA: No    Family history of colon CA: No      HISTORY:  OB History    Para Term  AB Living   1 1 0 1 0 1   SAB IAB Ectopic Multiple Live Births   0 0 0 0 1      # Outcome Date GA Lbr Miguel Angel/2nd Weight Sex Type Anes PTL Lv   1  12 32w3d  1.645 kg (3 lb 10 oz) M CS-LTranv  Y SOFY      Birth Comments: short umbilical cord      Name: Nicanor Lucio      Apgar1: 7  Apgar5: 9      Obstetric Comments   Delivered for recurrent bradycardia, decelerations     Past Medical History:   Diagnosis Date    Abnormal Pap smear     colposcopy,     Hx of previous reproductive problem     clomid    Irregular menses     Umbilical cord short in labor and delivery, delivered      Past Surgical History:    Procedure Laterality Date     SECTION  2012    Procedure:  SECTION;   Section.;  Surgeon: Juancarlos Lezama MD;  Location: [facility] L+D     Family History   Problem Relation Age of Onset    Lipids Mother     Lipids Brother     Diabetes Brother         Type 2    C.A.D. Maternal Grandfather         quintuple bypass.  CHF    Blood Disease Paternal Grandfather         Non-Hodgkins Lymphoma    Alzheimer Disease Maternal Aunt         late 50s    Breast Cancer Maternal Aunt         postmenopausal     Social History     Socioeconomic History    Marital status:      Spouse name: Naveed    Number of children: 1    Years of education: None    Highest education level: None   Occupational History    Occupation:      Employer: [employer]   Tobacco Use    Smoking status: Never    Smokeless tobacco: Never   Substance and Sexual Activity    Alcohol use: No    Drug use: No    Sexual activity: Yes     Partners: Male     Birth control/protection: Pill   Other Topics Concern     Service No    Blood Transfusions No    Caffeine Concern Yes     Comment: Sensitive to caffeine    Occupational Exposure No    Hobby Hazards No    Sleep Concern No    Stress Concern No    Weight Concern No    Special Diet No    Back Care No    Exercise No    Bike Helmet Yes    Seat Belt Yes    Self-Exams No   Social History Narrative    Living arrangements - the patient lives with her family       Current Outpatient Medications:     carvedilol (COREG) 12.5 MG tablet, Take 12.5 mg by mouth., Disp: , Rfl:     desogestrel-ethinyl estradiol (JULEBER) 0.15-30 MG-MCG tablet, TAKE ONE ACTIVE TABLET BY MOUTH ONCE DAILY. TAKE CONTINUOUSLY, SKIPPING PLACEBO PILLS, Disp: 112 tablet, Rfl: 0    Fexofenadine HCl (ALLEGRA PO), , Disp: , Rfl:     LOSARTAN POTASSIUM-HCTZ PO, , Disp: , Rfl:     sertraline (ZOLOFT) 100 MG tablet, TAKE 2 TABLETS (200 MG) BY MOUTH DAILY, Disp: 180 tablet, Rfl: 0    calcium carbonate  (OS-RAYMOND) 500 MG tablet, Take 1 tablet (500 mg) by mouth 2 times daily, Disp: 60 tablet, Rfl: 3     Allergies   Allergen Reactions    Caffeine     Codeine Sulfate     Vicodin [Hydrocodone-Acetaminophen]        Past medical, surgical, social and family history were reviewed and updated in EPIC.    ROS:   Negative other than as noted above.      OBJECTIVE:                                                      EXAM:  /74   Wt 69.4 kg (153 lb)   BMI 29.88 kg/m     BMI: Body mass index is 29.88 kg/m .  General: Alert and oriented, no distress.  Psychiatric: Mood and affect within normal limits.  Skin: Warm and dry, no lesions, rashes or discolorations.  Neck: Neck supple. Thyroid palpbably normal in size and without nodularity.  Breasts:  Symmetric, no skin changes.  No dominant masses bilaterally.   Lymph:  No cervical, supraclavicular, infraclavicular, axillary or inguinal lymphadenopathy palpable.   Abdomen: Soft, nontender, no hepatosplenomegaly, no rebound or guarding, no masses, no hernias.   Vulva:  No external lesions,  no inguinal adenopathy.  External genitalia, Bartholin, urethral, and [place] glands within normal limits.  Urethra:  Midline, non-tender, well supported, no discharge  Vagina:  Well-estrogenized, no abnormal discharge, no lesions  Cervix: no lesions, no discharge  Uterus:  anteverted, smooth contour, without enlargement, mobile, and without tenderness  Ovaries:  No masses appreciated, non-tender, mobile  Musculoskeletal: extremities normal      COUNSELING:   Reviewed preventive health counseling, as reflected in patient instructions   reports that she has never smoked. She has never used smokeless tobacco.        ASSESSMENT/PLAN:                                                      44 year old female with satisfactory annual exam  (Z12.4) Pap smear for cervical cancer screening  (primary encounter diagnosis)  Comment:   Plan: HPV and Gynecologic Cytology Panel -         Recommended Age 30-65  Years        Repeat in 5 years of both normal    (F42.9) Obsessive-compulsive disorder, unspecified type  Comment:   Plan: sertraline (ZOLOFT) 100 MG tablet        Stable, renewed for 1 year.    (N92.1) Metrorrhagia  Comment:   Plan: desogestrel-ethinyl estradiol (JULEBER) 0.15-30        MG-MCG tablet        Completely controlled with continuous OCPs, will continue this for another year.    (Z12.31) Visit for screening mammogram  Comment:   Plan: MA Screen Bilateral w/Luis A        Reviewed that she is due for screening mammogram, and she plans to schedule that on her way out today.    Verito Sanchez MD

## 2024-09-20 NOTE — NURSING NOTE
Chief Complaint   Patient presents with    Gyn Exam     Pap and mammo due, refill sertraline       Initial /74   Wt 69.4 kg (153 lb)   BMI 29.88 kg/m   Estimated body mass index is 29.88 kg/m  as calculated from the following:    Height as of 21: 1.524 m (5').    Weight as of this encounter: 69.4 kg (153 lb).  BP completed using cuff size: regular    Questioned patient about current smoking habits.  Pt. has never smoked.          The following HM Due: mammogram  pap smear

## 2024-09-23 LAB
HPV HR 12 DNA CVX QL NAA+PROBE: NEGATIVE
HPV16 DNA CVX QL NAA+PROBE: NEGATIVE
HPV18 DNA CVX QL NAA+PROBE: NEGATIVE
HUMAN PAPILLOMA VIRUS FINAL DIAGNOSIS: NORMAL

## 2024-09-25 LAB
BKR AP ASSOCIATED HPV REPORT: NORMAL
BKR LAB AP GYN ADEQUACY: NORMAL
BKR LAB AP GYN INTERPRETATION: NORMAL
BKR LAB AP PREVIOUS ABNORMAL: NORMAL
PATH REPORT.COMMENTS IMP SPEC: NORMAL
PATH REPORT.COMMENTS IMP SPEC: NORMAL
PATH REPORT.RELEVANT HX SPEC: NORMAL

## 2025-08-21 ENCOUNTER — PATIENT OUTREACH (OUTPATIENT)
Dept: CARE COORDINATION | Facility: CLINIC | Age: 45
End: 2025-08-21
Payer: COMMERCIAL